# Patient Record
Sex: FEMALE | Race: WHITE | NOT HISPANIC OR LATINO | Employment: OTHER | URBAN - NONMETROPOLITAN AREA
[De-identification: names, ages, dates, MRNs, and addresses within clinical notes are randomized per-mention and may not be internally consistent; named-entity substitution may affect disease eponyms.]

---

## 2018-03-13 ENCOUNTER — HISTORICAL (OUTPATIENT)
Dept: ADMINISTRATIVE | Facility: HOSPITAL | Age: 73
End: 2018-03-13

## 2018-05-16 ENCOUNTER — HISTORICAL (OUTPATIENT)
Dept: ADMINISTRATIVE | Facility: HOSPITAL | Age: 73
End: 2018-05-16

## 2018-08-14 ENCOUNTER — HISTORICAL (OUTPATIENT)
Dept: ADMINISTRATIVE | Facility: HOSPITAL | Age: 73
End: 2018-08-14

## 2018-08-16 ENCOUNTER — HISTORICAL (OUTPATIENT)
Dept: ADMINISTRATIVE | Facility: HOSPITAL | Age: 73
End: 2018-08-16

## 2019-02-04 ENCOUNTER — HISTORICAL (OUTPATIENT)
Dept: ADMINISTRATIVE | Facility: HOSPITAL | Age: 74
End: 2019-02-04

## 2019-05-09 ENCOUNTER — HISTORICAL (OUTPATIENT)
Dept: ADMINISTRATIVE | Facility: HOSPITAL | Age: 74
End: 2019-05-09

## 2019-06-24 ENCOUNTER — HISTORICAL (OUTPATIENT)
Dept: ADMINISTRATIVE | Facility: HOSPITAL | Age: 74
End: 2019-06-24

## 2019-06-26 ENCOUNTER — HISTORICAL (OUTPATIENT)
Dept: ADMINISTRATIVE | Facility: HOSPITAL | Age: 74
End: 2019-06-26

## 2020-02-20 ENCOUNTER — HISTORICAL (OUTPATIENT)
Dept: ADMINISTRATIVE | Facility: HOSPITAL | Age: 75
End: 2020-02-20

## 2020-07-09 ENCOUNTER — HISTORICAL (OUTPATIENT)
Dept: ADMINISTRATIVE | Facility: HOSPITAL | Age: 75
End: 2020-07-09

## 2020-07-27 ENCOUNTER — HISTORICAL (OUTPATIENT)
Dept: ADMINISTRATIVE | Facility: HOSPITAL | Age: 75
End: 2020-07-27

## 2020-08-03 LAB
ABS NEUT (OLG): 3.5 X10(3)/MCL (ref 1.5–6.9)
APPEARANCE, UA: CLEAR
BILIRUB UR QL STRIP: NEGATIVE
BUN SERPL-MCNC: 13 MG/DL (ref 9.8–20.1)
CALCIUM SERPL-MCNC: 10 MG/DL (ref 8.4–10.2)
CHLORIDE SERPL-SCNC: 92 MMOL/L (ref 98–107)
CO2 SERPL-SCNC: 34 MMOL/L (ref 23–31)
COLOR UR: YELLOW
CREAT SERPL-MCNC: 0.79 MG/DL (ref 0.55–1.02)
CREAT/UREA NIT SERPL: 16
ERYTHROCYTE [DISTWIDTH] IN BLOOD BY AUTOMATED COUNT: 14.4 % (ref 11.5–17)
GLUCOSE (UA): NEGATIVE MG/DL
GLUCOSE SERPL-MCNC: 107 MG/DL (ref 82–115)
HCT VFR BLD AUTO: 36.6 % (ref 36–48)
HGB BLD-MCNC: 11.8 GM/DL (ref 12–16)
HGB UR QL STRIP: NEGATIVE
KETONES UR QL STRIP: NEGATIVE MG/DL
LEUKOCYTE ESTERASE UR QL STRIP: NEGATIVE
MCH RBC QN AUTO: 28 PG (ref 27–34)
MCHC RBC AUTO-ENTMCNC: 32 GM/DL (ref 31–36)
MCV RBC AUTO: 85 FL (ref 80–99)
NITRITE UR QL STRIP: NEGATIVE
PH UR STRIP: 8.5 [PH] (ref 4.6–8)
PLATELET # BLD AUTO: 278 X10(3)/MCL (ref 140–400)
PMV BLD AUTO: 9 FL (ref 6.8–10)
POTASSIUM SERPL-SCNC: 3.9 MMOL/L (ref 3.5–5.1)
PROT UR QL STRIP: NEGATIVE MG/DL
RBC # BLD AUTO: 4.29 X10(6)/MCL (ref 4.2–5.4)
SODIUM SERPL-SCNC: 136 MMOL/L (ref 136–145)
SP GR UR STRIP: 1.01 (ref 1–1.03)
UROBILINOGEN UR STRIP-ACNC: 0.2 EU/DL
WBC # SPEC AUTO: 5.8 X10(3)/MCL (ref 4.5–11.5)

## 2020-08-07 ENCOUNTER — HISTORICAL (OUTPATIENT)
Dept: MEDSURG UNIT | Facility: HOSPITAL | Age: 75
End: 2020-08-07

## 2020-08-08 LAB
HCT VFR BLD AUTO: 26.3 % (ref 36–48)
HGB BLD-MCNC: 8.7 GM/DL (ref 12–16)

## 2021-02-23 ENCOUNTER — HISTORICAL (OUTPATIENT)
Dept: ADMINISTRATIVE | Facility: HOSPITAL | Age: 76
End: 2021-02-23

## 2021-12-06 ENCOUNTER — HISTORICAL (OUTPATIENT)
Dept: ADMINISTRATIVE | Facility: HOSPITAL | Age: 76
End: 2021-12-06

## 2022-03-04 ENCOUNTER — HISTORICAL (OUTPATIENT)
Dept: ADMINISTRATIVE | Facility: HOSPITAL | Age: 77
End: 2022-03-04

## 2022-04-30 NOTE — OP NOTE
PREOPERATIVE DIAGNOSIS:  Severe spinal stenosis at L3-4 and L4-5.    POSTOPERATIVE DIAGNOSIS:  Severe spinal stenosis at L3-4 and L4-5.    PROCEDURE:  Lumbar laminectomy L3-4 and L4-5 bilaterally.    ANESTHESIA:  General.    BLOOD LOSS:  50 cc.    COMPLICATIONS:  None.    INDICATIONS:  Ms. Ordonez has had long-standing severe spinal stenosis with severe claudication.  Full informed consent was obtained.  Risks of the procedure were explained, not excluding infection, bleeding, pain, scarring, neurovascular injury, DVT, spinal instability, need for future surgery.    DESCRIPTION OF PROCEDURE:  She was brought the OR, given IV antibiotics.  A Cleaning was placed.  Rolled prone on a Desmond frame.  The back was prepped and draped sterilely.  Preincision marking was done identifying posterior spinous processes, L3, L4 and L5.  Mid lumbar incision was made.  I did use the bone scalpel for laminectomy.  Paraspinal muscles were reflected off the lamina and posterior spinous processes.  Deep retractors were positioned.  Facets were clearly identified at the L3-4 and L4-5 levels.  Care was taken to preserve 2/3 of the facet to try to avoid instability.  Using a bone scalpel, [employer] device with high-flow setting, did a laminectomy which created some beautiful vertical walls preserving pars as well as facets at both levels all sides.  Using tactile feel, carefully penetrated the inner cortex.  Then I could hinge the posterior spinous process and lamina off very carefully EN bloc, which respected the anatomical plane between the ligamentum flavum and the dura.  The dura was nicely and beautifully decompressed.  It was quite tight and thin.  I used a Kerrison #2 to clean up the facet on the right side, which was her more symptomatic side.  Inferiorly in the midline, she had a small sharp edge of bone which did create just a pinhole durotomy.  This was carefully dried and I used a dural sealant to seal that and then put a  fat graft on top and seal that as well, and it was a watertight seal.  Following this, beaded probe could easily be passed through the neuro foramen at both levels.  I was satisfied with the decompression and the resultant stability.  Fat was placed over the dura and laminectomy defect.  Paraspinal muscles were closed with interrupted #1 Vicryl, subcu 2-0 and very tight closure on the skin with 3-0 nylon.  Sealed the skin and put an Aquacel dressing.  No complications.        MICHAEL/EMBER   DD: 08/13/2020 1105   DT: 08/13/2020 1129  Job # [id number]

## 2022-05-05 NOTE — HISTORICAL [facility]
This is a historical note converted from [place]. Formatting and pictures may have been removed.  Please reference [facility] for original formatting and attached multimedia. Admit and Discharge Dates  Admit Date: 08/07/2020  Discharge Date: 08/09/2020  Physicians  Attending Physician - Gil BAUER, Bayron TAFOYA  Admitting Physician - Gil BAUER, Baryon TAFOYA  Consulting Physician - Gil BAUER, Bayron TAFOYA  Primary Care Physician - Rosemary BAUER, Loyd  Discharge Diagnosis  Spinal stenosis of lumbar region with radiculopathy?M48.061  Surgical Procedures  08/07/2020 - [id number] - Lumbar Decompression  Immunizations  No immunizations recorded for this visit.  Significant Findings  Patient did have a pinhole?durotomy?which was sealed with a dural sealant at the time of surgery.? No postoperative complications.  Objective  Vitals & Measurements  T:?36.8? ?C (Oral)? TMIN:?36.7? ?C (Oral)? TMAX:?37.3? ?C (Oral)? HR:?91(Peripheral)? BP:?120/63? SpO2:?99%?  Physical Exam  General:?well-developed well-nourished in no acute distress  Eye: EOMI, clear conjunctiva, eyelids normal  HENT:?Normocephalic/atraumatic, oropharynx and nasal mucosal surfaces moist  Neck: full range of motion, no lymphadenopathy  Respiratory:?Non-labored breathing  Cardiovascular:?regular rate and rhythm, no edema  Gastrointestinal:?soft, non-tender, non-distended, without masses to palpation  Genitourinary: no CVA tenderness to palpation  Musculoskeletal:???? Lumbar spine:?Aquacel dressing is clean dry and intact there is no excessive swelling or erythema noted about the incision area. ?Patient is neurovascularly intact to her bilateral lower extremities.  Integumentary: no rashes or skin lesions present  Neurologic: cranial nerves intact, no signs of peripheral neurological deficit, motor/sensory function intact  ?  Patient Discharge Condition  Stable, denies chest pain calf pain dyspnea or shortness of breath.? Hemoglobin?yesterday on 8/8/2020?was?8.7,  hematocrit was 26.3  Discharge Disposition  Patient is discharged to home discussion was had regarding no excessive?bending lifting?twisting?turning pushing or pulling.  She is discharged home with Norco 10/325 mg every 6 hours as needed for pain and Duricef 500 mg twice daily for 10 days.? Patient will follow-up?in our [place] clinic with Dr. Plummer on 8/17/2020 at 9:00 AM. ?Aquacel dressing may stay in place until that time.? She is to contact the office should any problems arise in the interim   Discharge Medication Reconciliation  Prescribed  acetaminophen-HYDROcodone (acetaminophen-hydrocodone 325 mg-10 mg oral tablet)?1 tab(s), Oral, q4hr, PRN pain, mild  Continue  aspirin (Aron Aspirin Regimen 81 mg oral delayed release tablet)?81 mg, Oral, Daily  carvedilol (carvedilol 6.25 mg oral tablet)?6.25 mg, Oral, BID  ezetimibe (ezetimibe 10 mg oral tablet)?10 mg, Oral, qPM  furosemide (furosemide 40 mg oral tablet)?40 mg, Oral, qAM  isosorbide mononitrate (isosorbide MONOnitrate 30 mg oral tablet, Extended Release)?30 mg, Oral, qAM  lisinopril (lisinopril 20 mg oral tablet)?20 mg, Oral, qAM  pantoprazole (Pantoprazole 40 mg ORAL EC-Tablet)?40 mg, Oral, qAM  potassium chloride (potassium chloride 20 mEq oral TABLET extended release)?20 mEq, Oral, qAM  pravastatin (pravastatin 10 mg oral tablet)?10 mg, Oral, qAM  venlafaxine (venlafaxine 75 mg oral capsule, extended release)?75 mg, Oral, qAM  Education and Orders Provided  Discharge - 08/09/20 12:56:00 CDT, Home, D/c to home. No bending, lifting, twisting, turning, pushing or pulling. ?Norco 10/325mg q 6 hrs prn, Duricef 500mg BID x 10 days. Follow up with Dr. Cordero 08/17/20 at 9:00am?  Follow up  Bayron Cordero, on 08/17/2020

## 2022-06-20 DIAGNOSIS — M18.12 PRIMARY OSTEOARTHRITIS OF FIRST CARPOMETACARPAL JOINT OF LEFT HAND: Primary | ICD-10-CM

## 2022-06-21 ENCOUNTER — HOSPITAL ENCOUNTER (OUTPATIENT)
Dept: RADIOLOGY | Facility: HOSPITAL | Age: 77
Discharge: HOME | End: 2022-06-21
Attending: SPECIALIST
Payer: MEDICARE

## 2022-06-21 ENCOUNTER — CLINICAL SUPPORT (OUTPATIENT)
Dept: RESPIRATORY THERAPY | Facility: HOSPITAL | Age: 77
End: 2022-06-21
Attending: SPECIALIST
Payer: MEDICARE

## 2022-06-21 DIAGNOSIS — M18.12 PRIMARY OSTEOARTHRITIS OF FIRST CARPOMETACARPAL JOINT OF LEFT HAND: ICD-10-CM

## 2022-06-21 DIAGNOSIS — Z01.818 PRE-OP EXAM: ICD-10-CM

## 2022-06-21 PROCEDURE — 71045 X-RAY EXAM CHEST 1 VIEW: CPT | Mod: TC

## 2022-06-21 PROCEDURE — 93005 ELECTROCARDIOGRAM TRACING: CPT

## 2022-06-21 RX ORDER — CARVEDILOL 6.25 MG/1
6.25 TABLET ORAL 2 TIMES DAILY
COMMUNITY
Start: 2022-06-07

## 2022-06-21 RX ORDER — ASPIRIN 81 MG/1
81 TABLET ORAL EVERY MORNING
COMMUNITY

## 2022-06-21 RX ORDER — SACUBITRIL AND VALSARTAN 49; 51 MG/1; MG/1
1 TABLET, FILM COATED ORAL 2 TIMES DAILY
COMMUNITY
Start: 2022-06-06

## 2022-06-21 RX ORDER — ROSUVASTATIN CALCIUM 5 MG/1
5 TABLET, COATED ORAL NIGHTLY
COMMUNITY
Start: 2022-05-05

## 2022-06-21 RX ORDER — VENLAFAXINE HYDROCHLORIDE 75 MG/1
75 CAPSULE, EXTENDED RELEASE ORAL EVERY MORNING
COMMUNITY
Start: 2022-05-23

## 2022-06-21 RX ORDER — SPIRONOLACTONE 25 MG/1
25 TABLET ORAL EVERY MORNING
COMMUNITY
Start: 2022-06-06

## 2022-06-21 RX ORDER — EZETIMIBE 10 MG/1
10 TABLET ORAL EVERY MORNING
COMMUNITY
Start: 2022-05-23

## 2022-06-21 RX ORDER — POTASSIUM CHLORIDE 20 MEQ/1
20 TABLET, EXTENDED RELEASE ORAL EVERY MORNING
COMMUNITY
Start: 2022-06-07

## 2022-06-21 RX ORDER — PANTOPRAZOLE SODIUM 40 MG/1
40 TABLET, DELAYED RELEASE ORAL EVERY MORNING
COMMUNITY

## 2022-06-21 RX ORDER — NITROGLYCERIN 0.4 MG/1
0.4 TABLET SUBLINGUAL DAILY PRN
COMMUNITY

## 2022-06-21 RX ORDER — FUROSEMIDE 20 MG/1
20 TABLET ORAL 2 TIMES DAILY
COMMUNITY
Start: 2022-06-17

## 2022-06-21 NOTE — DISCHARGE INSTRUCTIONS
Use CHG wipes as directed. Nothing to eat or drink after midnight. Take Coreg and Entresto AM of procedure with sip of water.

## 2022-06-22 ENCOUNTER — ANESTHESIA EVENT (OUTPATIENT)
Dept: SURGERY | Facility: HOSPITAL | Age: 77
End: 2022-06-22
Payer: MEDICARE

## 2022-06-22 NOTE — ANESTHESIA PREPROCEDURE EVALUATION
06/22/2022  Job Hancock is a 76 y.o., female.      Pre-op Assessment    I have reviewed the Patient Summary Reports.     I have reviewed the Nursing Notes. I have reviewed the NPO Status.   I have reviewed the Medications.     Review of Systems  Anesthesia Hx:  No problems with previous Anesthesia  Denies Family Hx of Anesthesia complications.   Denies Personal Hx of Anesthesia complications.   Social:  Non-Smoker    Hematology/Oncology:  Hematology Normal   Oncology Normal     EENT/Dental:EENT/Dental Normal   Cardiovascular:   Hypertension, well controlled CAD  CABG/stent  hyperlipidemia    Renal/:  Renal/ Normal     Hepatic/GI:   GERD    Musculoskeletal:   Arthritis     Neurological:  Neurology Normal    Endocrine:  Endocrine Normal    Dermatological:  Skin Normal    Psych:  Psychiatric Normal           Physical Exam  General: Well nourished, Cooperative, Alert and Oriented    Airway:  Mallampati: II   Mouth Opening: Normal  TM Distance: Normal  Tongue: Normal  Neck ROM: Normal ROM    Dental:  Intact        Anesthesia Plan  Type of Anesthesia, risks & benefits discussed:    Anesthesia Type: Gen ETT  Intra-op Monitoring Plan: Standard ASA Monitors  Post Op Pain Control Plan: multimodal analgesia  Induction:  IV  Airway Plan: Direct  Informed Consent: Informed consent signed with the Patient and all parties understand the risks and agree with anesthesia plan.  All questions answered.   ASA Score: 3  Day of Surgery Review of History & Physical: I have interviewed and examined the patient. I have reviewed the patient's H&P dated: There are no significant changes. H&P completed by Anesthesiologist.    Ready For Surgery From Anesthesia Perspective.     .

## 2022-06-23 ENCOUNTER — HOSPITAL ENCOUNTER (OUTPATIENT)
Facility: HOSPITAL | Age: 77
Discharge: HOME | End: 2022-06-23
Attending: SPECIALIST | Admitting: SPECIALIST
Payer: MEDICARE

## 2022-06-23 ENCOUNTER — ANESTHESIA (OUTPATIENT)
Dept: SURGERY | Facility: HOSPITAL | Age: 77
End: 2022-06-23
Payer: MEDICARE

## 2022-06-23 DIAGNOSIS — M18.12 PRIMARY OSTEOARTHRITIS OF FIRST CARPOMETACARPAL JOINT OF LEFT HAND: ICD-10-CM

## 2022-06-23 PROCEDURE — C1713 ANCHOR/SCREW BN/BN,TIS/BN: HCPCS | Performed by: SPECIALIST

## 2022-06-23 PROCEDURE — 25000003 PHARM REV CODE 250: Performed by: NURSE ANESTHETIST, CERTIFIED REGISTERED

## 2022-06-23 PROCEDURE — 63600175 PHARM REV CODE 636 W HCPCS: Performed by: NURSE ANESTHETIST, CERTIFIED REGISTERED

## 2022-06-23 PROCEDURE — 71000015 HC POSTOP RECOV 1ST HR: Performed by: SPECIALIST

## 2022-06-23 PROCEDURE — 63600175 PHARM REV CODE 636 W HCPCS: Performed by: ANESTHESIOLOGY

## 2022-06-23 PROCEDURE — 71000033 HC RECOVERY, INTIAL HOUR: Performed by: SPECIALIST

## 2022-06-23 PROCEDURE — 36000709 HC OR TIME LEV III EA ADD 15 MIN: Performed by: SPECIALIST

## 2022-06-23 PROCEDURE — 25000003 PHARM REV CODE 250: Performed by: PHYSICIAN ASSISTANT

## 2022-06-23 PROCEDURE — 71000016 HC POSTOP RECOV ADDL HR: Performed by: SPECIALIST

## 2022-06-23 PROCEDURE — 25000003 PHARM REV CODE 250: Performed by: ANESTHESIOLOGY

## 2022-06-23 PROCEDURE — 63600175 PHARM REV CODE 636 W HCPCS: Performed by: SPECIALIST

## 2022-06-23 PROCEDURE — 37000008 HC ANESTHESIA 1ST 15 MINUTES: Performed by: SPECIALIST

## 2022-06-23 PROCEDURE — 36000708 HC OR TIME LEV III 1ST 15 MIN: Performed by: SPECIALIST

## 2022-06-23 PROCEDURE — 37000009 HC ANESTHESIA EA ADD 15 MINS: Performed by: SPECIALIST

## 2022-06-23 DEVICE — SYS IMPLANT CMC MIN TR 1.1MM: Type: IMPLANTABLE DEVICE | Site: HAND | Status: FUNCTIONAL

## 2022-06-23 RX ORDER — PROPOFOL 10 MG/ML
INJECTION, EMULSION INTRAVENOUS
Status: DISCONTINUED | OUTPATIENT
Start: 2022-06-23 | End: 2022-06-23

## 2022-06-23 RX ORDER — LIDOCAINE HYDROCHLORIDE 10 MG/ML
1 INJECTION, SOLUTION EPIDURAL; INFILTRATION; INTRACAUDAL; PERINEURAL ONCE
Status: DISCONTINUED | OUTPATIENT
Start: 2022-06-23 | End: 2022-06-23

## 2022-06-23 RX ORDER — ONDANSETRON 2 MG/ML
4 INJECTION INTRAMUSCULAR; INTRAVENOUS EVERY 12 HOURS PRN
Status: DISCONTINUED | OUTPATIENT
Start: 2022-06-23 | End: 2022-06-23 | Stop reason: HOSPADM

## 2022-06-23 RX ORDER — SODIUM CHLORIDE, SODIUM LACTATE, POTASSIUM CHLORIDE, CALCIUM CHLORIDE 600; 310; 30; 20 MG/100ML; MG/100ML; MG/100ML; MG/100ML
INJECTION, SOLUTION INTRAVENOUS CONTINUOUS
Status: DISCONTINUED | OUTPATIENT
Start: 2022-06-23 | End: 2022-06-23 | Stop reason: HOSPADM

## 2022-06-23 RX ORDER — KETOROLAC TROMETHAMINE 30 MG/ML
15 INJECTION, SOLUTION INTRAMUSCULAR; INTRAVENOUS ONCE
Status: DISCONTINUED | OUTPATIENT
Start: 2022-06-23 | End: 2022-06-23

## 2022-06-23 RX ORDER — HYDROMORPHONE HYDROCHLORIDE 2 MG/ML
0.2 INJECTION, SOLUTION INTRAMUSCULAR; INTRAVENOUS; SUBCUTANEOUS EVERY 5 MIN PRN
Status: DISCONTINUED | OUTPATIENT
Start: 2022-06-23 | End: 2022-06-23 | Stop reason: HOSPADM

## 2022-06-23 RX ORDER — ONDANSETRON 4 MG/1
8 TABLET, ORALLY DISINTEGRATING ORAL EVERY 8 HOURS PRN
Status: DISCONTINUED | OUTPATIENT
Start: 2022-06-23 | End: 2022-06-23 | Stop reason: HOSPADM

## 2022-06-23 RX ORDER — KETOROLAC TROMETHAMINE 30 MG/ML
INJECTION, SOLUTION INTRAMUSCULAR; INTRAVENOUS
Status: DISCONTINUED | OUTPATIENT
Start: 2022-06-23 | End: 2022-06-23

## 2022-06-23 RX ORDER — LIDOCAINE HYDROCHLORIDE 20 MG/ML
INJECTION, SOLUTION EPIDURAL; INFILTRATION; INTRACAUDAL; PERINEURAL
Status: DISCONTINUED | OUTPATIENT
Start: 2022-06-23 | End: 2022-06-23

## 2022-06-23 RX ORDER — OXYCODONE HYDROCHLORIDE 5 MG/1
10 TABLET ORAL EVERY 4 HOURS PRN
Status: DISCONTINUED | OUTPATIENT
Start: 2022-06-23 | End: 2022-06-23 | Stop reason: HOSPADM

## 2022-06-23 RX ORDER — KETOROLAC TROMETHAMINE 30 MG/ML
15 INJECTION, SOLUTION INTRAMUSCULAR; INTRAVENOUS ONCE
Status: DISCONTINUED | OUTPATIENT
Start: 2022-06-23 | End: 2022-06-23 | Stop reason: HOSPADM

## 2022-06-23 RX ORDER — CEFAZOLIN SODIUM 2 G/50ML
2 SOLUTION INTRAVENOUS
Status: COMPLETED | OUTPATIENT
Start: 2022-06-23 | End: 2022-06-23

## 2022-06-23 RX ORDER — DEXAMETHASONE SODIUM PHOSPHATE 4 MG/ML
INJECTION, SOLUTION INTRA-ARTICULAR; INTRALESIONAL; INTRAMUSCULAR; INTRAVENOUS; SOFT TISSUE
Status: DISCONTINUED | OUTPATIENT
Start: 2022-06-23 | End: 2022-06-23

## 2022-06-23 RX ORDER — PHENYLEPHRINE HYDROCHLORIDE 10 MG/ML
INJECTION INTRAVENOUS
Status: DISCONTINUED | OUTPATIENT
Start: 2022-06-23 | End: 2022-06-23

## 2022-06-23 RX ORDER — TRAMADOL HYDROCHLORIDE 50 MG/1
50 TABLET ORAL
Status: COMPLETED | OUTPATIENT
Start: 2022-06-23 | End: 2022-06-23

## 2022-06-23 RX ORDER — TRAMADOL HYDROCHLORIDE 50 MG/1
50 TABLET ORAL EVERY 6 HOURS PRN
Status: DISCONTINUED | OUTPATIENT
Start: 2022-06-23 | End: 2022-06-23 | Stop reason: HOSPADM

## 2022-06-23 RX ORDER — HYDROCODONE BITARTRATE AND ACETAMINOPHEN 10; 325 MG/1; MG/1
1 TABLET ORAL EVERY 6 HOURS PRN
Qty: 28 TABLET | Refills: 0 | Status: SHIPPED | OUTPATIENT
Start: 2022-06-23

## 2022-06-23 RX ORDER — ONDANSETRON 2 MG/ML
INJECTION INTRAMUSCULAR; INTRAVENOUS
Status: DISCONTINUED | OUTPATIENT
Start: 2022-06-23 | End: 2022-06-23

## 2022-06-23 RX ORDER — MORPHINE SULFATE 10 MG/ML
2 INJECTION INTRAMUSCULAR; INTRAVENOUS; SUBCUTANEOUS
Status: DISCONTINUED | OUTPATIENT
Start: 2022-06-23 | End: 2022-06-23 | Stop reason: HOSPADM

## 2022-06-23 RX ORDER — EPHEDRINE SULFATE 50 MG/ML
INJECTION, SOLUTION INTRAVENOUS
Status: DISCONTINUED | OUTPATIENT
Start: 2022-06-23 | End: 2022-06-23

## 2022-06-23 RX ORDER — GABAPENTIN 300 MG/1
300 CAPSULE ORAL
Status: COMPLETED | OUTPATIENT
Start: 2022-06-23 | End: 2022-06-23

## 2022-06-23 RX ORDER — ACETAMINOPHEN 500 MG
1000 TABLET ORAL
Status: COMPLETED | OUTPATIENT
Start: 2022-06-23 | End: 2022-06-23

## 2022-06-23 RX ORDER — CEFADROXIL 500 MG/1
500 CAPSULE ORAL EVERY 12 HOURS
Qty: 14 CAPSULE | Refills: 0 | Status: SHIPPED | OUTPATIENT
Start: 2022-06-23 | End: 2022-06-30

## 2022-06-23 RX ADMIN — PHENYLEPHRINE HYDROCHLORIDE 200 MCG: 10 INJECTION INTRAVENOUS at 08:06

## 2022-06-23 RX ADMIN — EPHEDRINE SULFATE 20 MG: 50 INJECTION INTRAVENOUS at 07:06

## 2022-06-23 RX ADMIN — ONDANSETRON 4 MG: 2 INJECTION INTRAMUSCULAR; INTRAVENOUS at 07:06

## 2022-06-23 RX ADMIN — KETOROLAC TROMETHAMINE 15 MG: 30 INJECTION, SOLUTION INTRAMUSCULAR; INTRAVENOUS at 01:06

## 2022-06-23 RX ADMIN — OXYCODONE HYDROCHLORIDE 10 MG: 5 TABLET ORAL at 10:06

## 2022-06-23 RX ADMIN — HYDROMORPHONE HYDROCHLORIDE 0.2 MG: 2 INJECTION, SOLUTION INTRAMUSCULAR; INTRAVENOUS; SUBCUTANEOUS at 09:06

## 2022-06-23 RX ADMIN — KETOROLAC TROMETHAMINE 15 MG: 30 INJECTION, SOLUTION INTRAMUSCULAR at 08:06

## 2022-06-23 RX ADMIN — ACETAMINOPHEN 1000 MG: 500 TABLET, FILM COATED ORAL at 05:06

## 2022-06-23 RX ADMIN — SODIUM CHLORIDE, POTASSIUM CHLORIDE, SODIUM LACTATE AND CALCIUM CHLORIDE: 600; 310; 30; 20 INJECTION, SOLUTION INTRAVENOUS at 05:06

## 2022-06-23 RX ADMIN — DEXAMETHASONE SODIUM PHOSPHATE 4 MG: 4 INJECTION, SOLUTION INTRA-ARTICULAR; INTRALESIONAL; INTRAMUSCULAR; INTRAVENOUS; SOFT TISSUE at 07:06

## 2022-06-23 RX ADMIN — PHENYLEPHRINE HYDROCHLORIDE 200 MCG: 10 INJECTION INTRAVENOUS at 07:06

## 2022-06-23 RX ADMIN — EPHEDRINE SULFATE 10 MG: 50 INJECTION INTRAVENOUS at 07:06

## 2022-06-23 RX ADMIN — LIDOCAINE HYDROCHLORIDE 40 MG: 20 INJECTION, SOLUTION EPIDURAL; INFILTRATION; INTRACAUDAL; PERINEURAL at 06:06

## 2022-06-23 RX ADMIN — HYDROMORPHONE HYDROCHLORIDE 0.2 MG: 2 INJECTION, SOLUTION INTRAMUSCULAR; INTRAVENOUS; SUBCUTANEOUS at 08:06

## 2022-06-23 RX ADMIN — PHENYLEPHRINE HYDROCHLORIDE 100 MCG: 10 INJECTION INTRAVENOUS at 08:06

## 2022-06-23 RX ADMIN — PROPOFOL 120 MG: 10 INJECTION, EMULSION INTRAVENOUS at 06:06

## 2022-06-23 RX ADMIN — GABAPENTIN 300 MG: 300 CAPSULE ORAL at 05:06

## 2022-06-23 RX ADMIN — CEFAZOLIN SODIUM 2 G: 2 SOLUTION INTRAVENOUS at 07:06

## 2022-06-23 RX ADMIN — TRAMADOL HYDROCHLORIDE 50 MG: 50 TABLET, COATED ORAL at 05:06

## 2022-06-23 NOTE — OP NOTE
Operative note     Date: 6/23/2022     Preop diagnosis:  Carpometacarpal osteoarthritis left thumb    Postop diagnosis: same    Procedure:  CMC suspension arthroplasty with excision of trapezium left thumb    Surgeon: Bayron Cordero M.D.    Assistant: KALYN Corral    Anesthesia:  General    Complications: none    Blood loss: nil    Procedure in detail:  Informed consent was obtained.  Risks of the procedure was explained not excluding infection, bleeding, pain, scarring, neurovascular injury, recurrent subluxation of the thumb.  This patient has a long history of advanced CMC arthroplasty at the base of the thumb.  Her trapezium is arthritic on both the metacarpal side as well as the scaphoid side.  CMC arthroplasty was explained and I recommended suspension stabilization with a mini tight rope.  She was brought to the OR given general anesthesia.  She was given IV antibiotics.  Arm was prepped and draped and tourniquet was inflated to 250. A dorsal radial incision was made over the base of the 1st metacarpal.  Small branch the radial artery was identified and protected.  The capsule was opened over the arthritic CMC joint.  The trapezium was resected using a rongeur.  The entire trapezium was excised.  The abductor tendon to the base of the 1st metacarpal was protected and left intact.  Next a 2nd incision was made over the 2nd webspace at the proximal end of the ulnar side of the 2nd metacarpal shaft.  Periosteum over the metacarpal was elevated for exposure next using fluoro advanced a K-wire across the base of the 1st metacarpal up across the 2nd in a direction that was suspend the thumb in appropriate position.  Next the suture was passed from the 1st to 2nd metacarpal and the button was positioned over the base of the 1st.  Provisional tension was applied and using C-arm to ensure that it was not overly tightened.  I could lay the hand flat and mobilization of the thumb was still  excellent.  Sq knot was placed over the 2nd button on the ulnar side of the 2nd.  And 4 additional knots.  Soft tissue was closed over the button suture.  The skin was closed with 4-0 nylon.  Sterile dressing was applied and a thumb spica splint.  No complications.    Bayron Cordero M.D.

## 2022-06-23 NOTE — ANESTHESIA PROCEDURE NOTES
Intubation    Date/Time: 6/23/2022 7:00 AM  Performed by: Arsenio Mejia CRNA  Authorized by: Nu Boyer MD     Intubation:     Induction:  Intravenous    Mask Ventilation:  N/a    Attempts:  1    Attempted By:  CRNA    Difficult Airway Encountered?: No      Airway Device:  Supraglottic airway/LMA    Airway Device Size:  3.0    Style/Cuff Inflation:  Uncuffed    Placement Verified By:  Capnometry    Complicating Factors:  None    Findings Post-Intubation:  BS equal bilateral and atraumatic/condition of teeth unchanged

## 2022-06-23 NOTE — ANESTHESIA POSTPROCEDURE EVALUATION
Anesthesia Post Evaluation    Patient: Job Hancock    Procedure(s) Performed: Procedure(s) (LRB):  INTERPOSITION ARTHROPLASTY, CMC JOINT / Ex. of trapezium with FCR inter postion graft & tight rope (Left)    Final Anesthesia Type: general      Patient location during evaluation: PACU  Patient participation: Yes- Able to Participate  Level of consciousness: awake and alert  Post-procedure vital signs: reviewed and stable  Pain management: adequate  Airway patency: patent    PONV status at discharge: No PONV  Anesthetic complications: no      Cardiovascular status: stable  Respiratory status: unassisted, room air and spontaneous ventilation  Hydration status: euvolemic  Follow-up not needed.          Vitals Value Taken Time   /56 06/23/22 0925   Temp 36.2 °C (97.2 °F) 06/23/22 0840   Pulse 74 06/23/22 0925   Resp 12 06/23/22 0925   SpO2 100 % 06/23/22 0925   Vitals shown include unvalidated device data.      No case tracking events are documented in the log.      Pain/Neli Score: Pain Rating Prior to Med Admin: 6 (6/23/2022  9:16 AM)  Neli Score: 8 (6/23/2022  9:22 AM)

## 2022-06-23 NOTE — DISCHARGE SUMMARY
[facility] - Periop Services  Discharge Note  Short Stay    Procedure(s) (LRB):  INTERPOSITION ARTHROPLASTY, CMC JOINT / Ex. of trapezium with FCR inter postion graft & tight rope (Left)    OUTCOME: Patient tolerated treatment/procedure well without complication and is now ready for discharge.    DISPOSITION: Home or Self Care    FINAL DIAGNOSIS:  Primary osteoarthritis of first carpometacarpal joint of left hand    FOLLOWUP: In clinic    DISCHARGE INSTRUCTIONS:    Discharge Procedure Orders   Diet general     Keep surgical extremity elevated     Ice to affected area   Order Comments: using barrier between ice and skin (specify duration&frequency)     No driving, operating heavy equipment or signing legal documents while taking pain medication     Call MD for:  temperature >100.4     Call MD for:  persistent nausea and vomiting     Call MD for:  severe uncontrolled pain     Call MD for:  difficulty breathing, headache or visual disturbances     Call MD for:  redness, tenderness, or signs of infection (pain, swelling, redness, odor or green/yellow discharge around incision site)     Call MD for:  hives     Call MD for:  persistent dizziness or light-headedness     Call MD for:  extreme fatigue     Leave dressing on - Keep it clean, dry, and intact until clinic visit        TIME SPENT ON DISCHARGE: 20 minutes

## 2022-06-23 NOTE — TRANSFER OF CARE
"Anesthesia Transfer of Care Note    Patient: Job Hancock    Procedure(s) Performed: Procedure(s) (LRB):  INTERPOSITION ARTHROPLASTY, CMC JOINT / Ex. of trapezium with FCR inter postion graft & tight rope (Left)    Patient location: PACU    Anesthesia Type: general    Transport from OR: Transported from OR on room air with adequate spontaneous ventilation    Post pain: adequate analgesia    Post assessment: no apparent anesthetic complications    Post vital signs: stable    Level of consciousness: awake    Nausea/Vomiting: no nausea/vomiting    Complications: none    Transfer of care protocol was followed      Last vitals:   Visit Vitals  /63 (BP Location: Right arm, Patient Position: Lying)   Pulse 65   Temp 36.5 °C (97.7 °F) (Oral)   Resp 18   Ht 5' 5" (1.651 m)   Wt 54.4 kg (120 lb)   SpO2 96%   Breastfeeding No   BMI 19.97 kg/m²     "

## 2022-06-28 VITALS
TEMPERATURE: 97 F | RESPIRATION RATE: 18 BRPM | WEIGHT: 120 LBS | HEART RATE: 70 BPM | DIASTOLIC BLOOD PRESSURE: 51 MMHG | OXYGEN SATURATION: 100 % | HEIGHT: 65 IN | BODY MASS INDEX: 19.99 KG/M2 | SYSTOLIC BLOOD PRESSURE: 104 MMHG

## 2023-01-05 ENCOUNTER — HISTORICAL (OUTPATIENT)
Dept: ADMINISTRATIVE | Facility: HOSPITAL | Age: 78
End: 2023-01-05
Payer: MEDICARE

## 2023-03-10 ENCOUNTER — HOSPITAL ENCOUNTER (OUTPATIENT)
Dept: RADIOLOGY | Facility: HOSPITAL | Age: 78
Discharge: HOME | End: 2023-03-10
Attending: INTERNAL MEDICINE
Payer: MEDICARE

## 2023-03-10 VITALS — WEIGHT: 120 LBS | BODY MASS INDEX: 20.49 KG/M2 | HEIGHT: 64 IN

## 2023-03-10 DIAGNOSIS — Z12.31 BREAST CANCER SCREENING BY MAMMOGRAM: ICD-10-CM

## 2023-03-10 PROCEDURE — 77067 SCR MAMMO BI INCL CAD: CPT | Mod: TC

## 2023-03-14 ENCOUNTER — HOSPITAL ENCOUNTER (OUTPATIENT)
Dept: RADIOLOGY | Facility: HOSPITAL | Age: 78
Discharge: HOME | End: 2023-03-14
Attending: INTERNAL MEDICINE
Payer: MEDICARE

## 2023-03-14 DIAGNOSIS — D49.3 NEOPLASM, BREAST: ICD-10-CM

## 2023-03-14 PROCEDURE — 76641 ULTRASOUND BREAST COMPLETE: CPT | Mod: TC,RT

## 2023-04-04 ENCOUNTER — LAB VISIT (OUTPATIENT)
Dept: LAB | Facility: HOSPITAL | Age: 78
End: 2023-04-04
Attending: INTERNAL MEDICINE
Payer: MEDICARE

## 2023-04-04 DIAGNOSIS — R79.1 ABNORMAL PROTHROMBIN TIME (PT): ICD-10-CM

## 2023-04-04 DIAGNOSIS — R79.89 ABNORMAL CBC: ICD-10-CM

## 2023-04-04 DIAGNOSIS — Z01.812 PRE-PROCEDURAL LABORATORY EXAMINATION: Primary | ICD-10-CM

## 2023-04-04 DIAGNOSIS — D68.9 COAGULATION DEFECT: ICD-10-CM

## 2023-04-04 LAB
ANION GAP SERPL CALC-SCNC: 6 MEQ/L (ref 2–13)
APTT PPP: 25 SECONDS (ref 23–29.4)
BASOPHILS # BLD AUTO: 0.03 X10(3)/MCL (ref 0.01–0.08)
BASOPHILS NFR BLD AUTO: 0.5 % (ref 0.1–1.2)
BUN SERPL-MCNC: 25 MG/DL (ref 7–20)
CALCIUM SERPL-MCNC: 10 MG/DL (ref 8.4–10.2)
CHLORIDE SERPL-SCNC: 104 MMOL/L (ref 98–110)
CO2 SERPL-SCNC: 30 MMOL/L (ref 21–32)
CREAT SERPL-MCNC: 0.74 MG/DL (ref 0.66–1.25)
CREAT/UREA NIT SERPL: 34 (ref 12–20)
EOSINOPHIL # BLD AUTO: 0.08 X10(3)/MCL (ref 0.04–0.36)
EOSINOPHIL NFR BLD AUTO: 1.3 % (ref 0.7–7)
ERYTHROCYTE [DISTWIDTH] IN BLOOD BY AUTOMATED COUNT: 13.5 % (ref 11–14.5)
GFR SERPLBLD CREATININE-BSD FMLA CKD-EPI: 82 MLS/MIN/1.73/M2
GLUCOSE SERPL-MCNC: 102 MG/DL (ref 70–115)
HCT VFR BLD AUTO: 34.5 % (ref 36–48)
HGB BLD-MCNC: 11 G/DL (ref 11.8–16)
IMM GRANULOCYTES # BLD AUTO: 0.06 X10(3)/MCL (ref 0–0.03)
IMM GRANULOCYTES NFR BLD AUTO: 1 % (ref 0–0.5)
INR BLD: 0.94
LYMPHOCYTES # BLD AUTO: 1.49 X10(3)/MCL (ref 1.16–3.74)
LYMPHOCYTES NFR BLD AUTO: 25 % (ref 20–55)
MCH RBC QN AUTO: 28.1 PG (ref 27–34)
MCV RBC AUTO: 88 FL (ref 79–99)
MEAN CELL HEMOGLOBIN CONCENTRATION (OHS) G/DL: 31.9 G/DL (ref 31–37)
MONOCYTES # BLD AUTO: 0.63 X10(3)/MCL (ref 0.24–0.36)
MONOCYTES NFR BLD AUTO: 10.6 % (ref 4.7–12.5)
NEUTROPHILS # BLD AUTO: 3.67 X10(3)/MCL (ref 1.56–6.13)
NEUTROPHILS NFR BLD AUTO: 61.6 % (ref 37–73)
NRBC BLD AUTO-RTO: 0 % (ref 0–1)
PLATELET # BLD AUTO: 311 X10(3)/MCL (ref 140–371)
PMV BLD AUTO: 9.6 FL (ref 9.4–12.4)
POTASSIUM SERPL-SCNC: 4.6 MMOL/L (ref 3.5–5.1)
PROTHROMBIN TIME: 9.6 SECONDS (ref 9.3–11.9)
RBC # BLD AUTO: 3.92 X10(6)/MCL (ref 4–5.1)
SODIUM SERPL-SCNC: 140 MMOL/L (ref 135–145)
WBC # SPEC AUTO: 6 X10(3)/MCL (ref 4–11.5)

## 2023-04-04 PROCEDURE — 80048 BASIC METABOLIC PNL TOTAL CA: CPT

## 2023-04-04 PROCEDURE — 85730 THROMBOPLASTIN TIME PARTIAL: CPT

## 2023-04-04 PROCEDURE — 85610 PROTHROMBIN TIME: CPT

## 2023-04-04 PROCEDURE — 85025 COMPLETE CBC W/AUTO DIFF WBC: CPT

## 2023-04-04 PROCEDURE — 36415 COLL VENOUS BLD VENIPUNCTURE: CPT

## 2023-05-30 ENCOUNTER — HOSPITAL ENCOUNTER (OUTPATIENT)
Dept: RADIOLOGY | Facility: HOSPITAL | Age: 78
Discharge: HOME | End: 2023-05-30
Attending: INTERNAL MEDICINE
Payer: MEDICARE

## 2023-05-30 DIAGNOSIS — M54.2 CERVICALGIA: ICD-10-CM

## 2023-05-30 PROCEDURE — 72125 CT NECK SPINE W/O DYE: CPT | Mod: TC

## 2023-06-20 ENCOUNTER — HOSPITAL ENCOUNTER (OUTPATIENT)
Dept: RADIOLOGY | Facility: HOSPITAL | Age: 78
Discharge: HOME | End: 2023-06-20
Attending: SPECIALIST
Payer: MEDICARE

## 2023-06-20 DIAGNOSIS — M79.641 PAIN IN RIGHT HAND: ICD-10-CM

## 2023-06-20 PROCEDURE — 73130 X-RAY EXAM OF HAND: CPT | Mod: TC,RT

## 2023-06-23 DIAGNOSIS — G56.01 CARPAL TUNNEL SYNDROME ON RIGHT: Primary | ICD-10-CM

## 2023-06-26 ENCOUNTER — HOSPITAL ENCOUNTER (OUTPATIENT)
Dept: RADIOLOGY | Facility: HOSPITAL | Age: 78
Discharge: HOME | End: 2023-06-26
Attending: SPECIALIST
Payer: MEDICARE

## 2023-06-26 ENCOUNTER — CLINICAL SUPPORT (OUTPATIENT)
Dept: RESPIRATORY THERAPY | Facility: HOSPITAL | Age: 78
End: 2023-06-26
Attending: SPECIALIST
Payer: MEDICARE

## 2023-06-26 DIAGNOSIS — G56.01 CARPAL TUNNEL SYNDROME ON RIGHT: ICD-10-CM

## 2023-06-26 DIAGNOSIS — Z01.811 PRE-OP CHEST EXAM: ICD-10-CM

## 2023-06-26 PROCEDURE — 93005 ELECTROCARDIOGRAM TRACING: CPT

## 2023-06-26 PROCEDURE — 71046 X-RAY EXAM CHEST 2 VIEWS: CPT | Mod: TC

## 2023-06-26 PROCEDURE — 93010 ELECTROCARDIOGRAM REPORT: CPT | Mod: ,,, | Performed by: STUDENT IN AN ORGANIZED HEALTH CARE EDUCATION/TRAINING PROGRAM

## 2023-06-26 PROCEDURE — 93010 EKG 12-LEAD: ICD-10-PCS | Mod: ,,, | Performed by: STUDENT IN AN ORGANIZED HEALTH CARE EDUCATION/TRAINING PROGRAM

## 2023-06-26 NOTE — DISCHARGE INSTRUCTIONS
POST-OP INSTRUCTIONS FOR CARPAL TUNNEL RELEASE     Bayron Cordero M.D.     Orthopedic Surgery/Sports Medicine     [facility] [place] [address]     [place] [address]     Office: [phone]          POST-OP INSTRUCTIONS FOR CARPAL TUNNEL RELEASE          1.  Keep splint dry and clean.     2.  Patient may remove splint two days after surgery.  Remove           splint sooner in case of severe pain or swelling of the hand.     3.  Keep incision dry and clean.  Clean incision with antibacterial           soap and water.     4.  Do not soak incision in water.     5.  No heavy lifting with hand.     6.  Patient may do very light activity with hand.     7.  Call Dr. Cordero or staff in case of severe pain, redness, or           excessive drainage from the incision.

## 2023-06-27 ENCOUNTER — ANESTHESIA EVENT (OUTPATIENT)
Dept: SURGERY | Facility: HOSPITAL | Age: 78
End: 2023-06-27
Payer: MEDICARE

## 2023-06-27 NOTE — ANESTHESIA PREPROCEDURE EVALUATION
06/27/2023  Job Hancock is a 77 y.o., female.      Pre-op Assessment    I have reviewed the Patient Summary Reports.     I have reviewed the Nursing Notes. I have reviewed the NPO Status.   I have reviewed the Medications.     Review of Systems  Anesthesia Hx:  Denies Family Hx of Anesthesia complications.   Denies Personal Hx of Anesthesia complications.   Social:  Non-Smoker, No Alcohol Use    Hematology/Oncology:  Hematology Normal   Oncology Normal     EENT/Dental:EENT/Dental Normal   Cardiovascular:   Hypertension, well controlled CAD asymptomatic  ECG has been reviewed.    Pulmonary:  Pulmonary Normal    Renal/:  Renal/ Normal     Hepatic/GI:   GERD, well controlled    Musculoskeletal:   Arthritis     Neurological:  Neurology Normal    Endocrine:  Endocrine Normal    Dermatological:  Skin Normal    Psych:  Psychiatric Normal           Physical Exam  General: Cooperative, Alert and Oriented    Airway:  Mallampati: II   Mouth Opening: Normal  TM Distance: Normal  Tongue: Normal  Neck ROM: Normal ROM    Dental:  Intact        Anesthesia Plan  Type of Anesthesia, risks & benefits discussed:    Anesthesia Type: Gen Natural Airway  Intra-op Monitoring Plan: Standard ASA Monitors  Post Op Pain Control Plan: multimodal analgesia  Induction:  IV  Informed Consent: Informed consent signed with the Patient and all parties understand the risks and agree with anesthesia plan.  All questions answered. Patient consented to blood products? Yes  ASA Score: 3    Ready For Surgery From Anesthesia Perspective.     .

## 2023-06-28 ENCOUNTER — ANESTHESIA (OUTPATIENT)
Dept: SURGERY | Facility: HOSPITAL | Age: 78
End: 2023-06-28
Payer: MEDICARE

## 2023-06-28 ENCOUNTER — HOSPITAL ENCOUNTER (OUTPATIENT)
Facility: HOSPITAL | Age: 78
Discharge: HOME | End: 2023-06-28
Attending: SPECIALIST | Admitting: SPECIALIST
Payer: MEDICARE

## 2023-06-28 VITALS — HEART RATE: 74 BPM | OXYGEN SATURATION: 100 % | DIASTOLIC BLOOD PRESSURE: 72 MMHG | SYSTOLIC BLOOD PRESSURE: 128 MMHG

## 2023-06-28 VITALS
TEMPERATURE: 98 F | OXYGEN SATURATION: 96 % | RESPIRATION RATE: 18 BRPM | HEART RATE: 57 BPM | WEIGHT: 124 LBS | DIASTOLIC BLOOD PRESSURE: 60 MMHG | HEIGHT: 65 IN | SYSTOLIC BLOOD PRESSURE: 101 MMHG | BODY MASS INDEX: 20.66 KG/M2

## 2023-06-28 DIAGNOSIS — G56.01 CARPAL TUNNEL SYNDROME OF RIGHT WRIST: ICD-10-CM

## 2023-06-28 DIAGNOSIS — G56.01 CARPAL TUNNEL SYNDROME ON RIGHT: Primary | ICD-10-CM

## 2023-06-28 PROCEDURE — D9220A PRA ANESTHESIA: Mod: ,,, | Performed by: NURSE ANESTHETIST, CERTIFIED REGISTERED

## 2023-06-28 PROCEDURE — 36000706: Performed by: SPECIALIST

## 2023-06-28 PROCEDURE — 71000015 HC POSTOP RECOV 1ST HR: Performed by: SPECIALIST

## 2023-06-28 PROCEDURE — 37000009 HC ANESTHESIA EA ADD 15 MINS: Performed by: SPECIALIST

## 2023-06-28 PROCEDURE — D9220A PRA ANESTHESIA: ICD-10-PCS | Mod: ,,, | Performed by: NURSE ANESTHETIST, CERTIFIED REGISTERED

## 2023-06-28 PROCEDURE — 36000707: Performed by: SPECIALIST

## 2023-06-28 PROCEDURE — 37000008 HC ANESTHESIA 1ST 15 MINUTES: Performed by: SPECIALIST

## 2023-06-28 PROCEDURE — 63600175 PHARM REV CODE 636 W HCPCS: Performed by: ANESTHESIOLOGY

## 2023-06-28 PROCEDURE — 25000003 PHARM REV CODE 250: Performed by: NURSE ANESTHETIST, CERTIFIED REGISTERED

## 2023-06-28 PROCEDURE — 71000016 HC POSTOP RECOV ADDL HR: Performed by: SPECIALIST

## 2023-06-28 PROCEDURE — 63600175 PHARM REV CODE 636 W HCPCS

## 2023-06-28 PROCEDURE — 63600175 PHARM REV CODE 636 W HCPCS: Performed by: SPECIALIST

## 2023-06-28 PROCEDURE — 25000003 PHARM REV CODE 250: Performed by: SPECIALIST

## 2023-06-28 RX ORDER — DEXMEDETOMIDINE HYDROCHLORIDE 100 UG/ML
INJECTION, SOLUTION INTRAVENOUS
Status: DISCONTINUED | OUTPATIENT
Start: 2023-06-28 | End: 2023-06-28

## 2023-06-28 RX ORDER — GLYCOPYRROLATE 0.2 MG/ML
INJECTION INTRAMUSCULAR; INTRAVENOUS
Status: DISCONTINUED | OUTPATIENT
Start: 2023-06-28 | End: 2023-06-28

## 2023-06-28 RX ORDER — MORPHINE SULFATE 4 MG/ML
2 INJECTION, SOLUTION INTRAMUSCULAR; INTRAVENOUS EVERY 4 HOURS PRN
Status: DISCONTINUED | OUTPATIENT
Start: 2023-06-28 | End: 2023-06-28 | Stop reason: HOSPADM

## 2023-06-28 RX ORDER — ONDANSETRON 2 MG/ML
4 INJECTION INTRAMUSCULAR; INTRAVENOUS EVERY 12 HOURS PRN
Status: DISCONTINUED | OUTPATIENT
Start: 2023-06-28 | End: 2023-06-28 | Stop reason: HOSPADM

## 2023-06-28 RX ORDER — ONDANSETRON HYDROCHLORIDE 2 MG/ML
INJECTION, SOLUTION INTRAMUSCULAR; INTRAVENOUS
Status: DISCONTINUED | OUTPATIENT
Start: 2023-06-28 | End: 2023-06-28

## 2023-06-28 RX ORDER — BUPIVACAINE HYDROCHLORIDE 2.5 MG/ML
INJECTION, SOLUTION EPIDURAL; INFILTRATION; INTRACAUDAL
Status: DISCONTINUED | OUTPATIENT
Start: 2023-06-28 | End: 2023-06-28 | Stop reason: HOSPADM

## 2023-06-28 RX ORDER — CEFADROXIL 500 MG/1
500 CAPSULE ORAL EVERY 12 HOURS
Qty: 14 CAPSULE | Refills: 0 | Status: SHIPPED | OUTPATIENT
Start: 2023-06-28 | End: 2023-07-05

## 2023-06-28 RX ORDER — HYDROCODONE BITARTRATE AND ACETAMINOPHEN 10; 325 MG/1; MG/1
1 TABLET ORAL EVERY 6 HOURS PRN
Qty: 28 TABLET | Refills: 0 | Status: SHIPPED | OUTPATIENT
Start: 2023-06-28

## 2023-06-28 RX ORDER — CEFAZOLIN SODIUM 2 G/50ML
2 SOLUTION INTRAVENOUS
Status: COMPLETED | OUTPATIENT
Start: 2023-06-28 | End: 2023-06-28

## 2023-06-28 RX ORDER — SODIUM CHLORIDE, SODIUM LACTATE, POTASSIUM CHLORIDE, CALCIUM CHLORIDE 600; 310; 30; 20 MG/100ML; MG/100ML; MG/100ML; MG/100ML
INJECTION, SOLUTION INTRAVENOUS CONTINUOUS
Status: ACTIVE | OUTPATIENT
Start: 2023-06-28

## 2023-06-28 RX ORDER — KETAMINE HYDROCHLORIDE 50 MG/ML
INJECTION, SOLUTION INTRAMUSCULAR; INTRAVENOUS
Status: DISCONTINUED | OUTPATIENT
Start: 2023-06-28 | End: 2023-06-28

## 2023-06-28 RX ORDER — TRAMADOL HYDROCHLORIDE 50 MG/1
50 TABLET ORAL EVERY 6 HOURS PRN
Status: DISCONTINUED | OUTPATIENT
Start: 2023-06-28 | End: 2023-06-28 | Stop reason: HOSPADM

## 2023-06-28 RX ORDER — ESMOLOL HYDROCHLORIDE 10 MG/ML
INJECTION INTRAVENOUS
Status: DISCONTINUED | OUTPATIENT
Start: 2023-06-28 | End: 2023-06-28

## 2023-06-28 RX ORDER — OXYCODONE HYDROCHLORIDE 5 MG/1
10 TABLET ORAL EVERY 6 HOURS PRN
Status: DISCONTINUED | OUTPATIENT
Start: 2023-06-28 | End: 2023-06-28 | Stop reason: HOSPADM

## 2023-06-28 RX ORDER — LIDOCAINE HYDROCHLORIDE 20 MG/ML
INJECTION, SOLUTION INFILTRATION; PERINEURAL
Status: DISCONTINUED | OUTPATIENT
Start: 2023-06-28 | End: 2023-06-28 | Stop reason: HOSPADM

## 2023-06-28 RX ORDER — ONDANSETRON 4 MG/1
8 TABLET, ORALLY DISINTEGRATING ORAL EVERY 8 HOURS PRN
Status: DISCONTINUED | OUTPATIENT
Start: 2023-06-28 | End: 2023-06-28 | Stop reason: HOSPADM

## 2023-06-28 RX ADMIN — GLYCOPYRROLATE 0.2 MG: 0.2 INJECTION INTRAMUSCULAR; INTRAVENOUS at 10:06

## 2023-06-28 RX ADMIN — ESMOLOL HYDROCHLORIDE 30 MG: 100 INJECTION, SOLUTION INTRAVENOUS at 10:06

## 2023-06-28 RX ADMIN — KETAMINE HYDROCHLORIDE 25 MG: 50 INJECTION, SOLUTION, CONCENTRATE INTRAMUSCULAR; INTRAVENOUS at 10:06

## 2023-06-28 RX ADMIN — DEXMEDETOMIDINE 8 MCG: 200 INJECTION, SOLUTION INTRAVENOUS at 10:06

## 2023-06-28 RX ADMIN — SODIUM CHLORIDE, POTASSIUM CHLORIDE, SODIUM LACTATE AND CALCIUM CHLORIDE: 600; 310; 30; 20 INJECTION, SOLUTION INTRAVENOUS at 08:06

## 2023-06-28 RX ADMIN — ONDANSETRON HYDROCHLORIDE 4 MG: 2 INJECTION, SOLUTION INTRAMUSCULAR; INTRAVENOUS at 10:06

## 2023-06-28 RX ADMIN — CEFAZOLIN SODIUM 2 G: 2 SOLUTION INTRAVENOUS at 10:06

## 2023-06-28 NOTE — DISCHARGE SUMMARY
[facility] - Periop Services  Discharge Note  Short Stay    Procedure(s) (LRB):  RELEASE, CARPAL TUNNEL (Right)      OUTCOME: Patient tolerated treatment/procedure well without complication and is now ready for discharge.    DISPOSITION: Home or Self Care    FINAL DIAGNOSIS:  Carpal tunnel syndrome on right    FOLLOWUP: In clinic    DISCHARGE INSTRUCTIONS:    Discharge Procedure Orders   Diet general     Keep surgical extremity elevated     No driving, operating heavy equipment or signing legal documents while taking pain medication     Remove dressing in 72 hours   Order Comments: May remove splint in 3 days. Replace with band aids or other dry dressing     Call MD for:  temperature >100.4     Call MD for:  persistent nausea and vomiting     Call MD for:  severe uncontrolled pain     Call MD for:  difficulty breathing, headache or visual disturbances     Call MD for:  redness, tenderness, or signs of infection (pain, swelling, redness, odor or green/yellow discharge around incision site)     Call MD for:  hives     Call MD for:  persistent dizziness or light-headedness     Call MD for:  extreme fatigue     Lifting restrictions   Order Comments: No lifting with affected extremity     Weight bearing restrictions (specify)        TIME SPENT ON DISCHARGE: 20 minutes

## 2023-06-28 NOTE — PATIENT INSTRUCTIONS
Keep splint in place until day 3, then may remove and cover incision area with Band-Aid or other dry dressing.  No excessive range of motion of the wrist.  Follow-up in 2 weeks

## 2023-06-28 NOTE — OP NOTE
Operative note     Date: 6/28/2023     Preop diagnosis: Right carpal tunnel release    Postop diagnosis: same    Procedure: Right carpal tunnel release    Surgeon: Bayron Cordero M.D.    Assistant: .    Anesthesia: local    Complications: none    Blood loss: nil    Procedure in detail:  Informed consent was obtained.  Risks of the procedure was explained not excluding infection, bleeding, pain, scarring, neurovascular injury, worsening or persistence of symptoms.  Patient was brought to the OR and given IV sedation.  I did a local injection in the left palm of the hand in the carpal tunnel.  The patient was prepped and draped sterilely.  The tourniquet was inflated to 250. A longitudinal incision was made in the palm of the hand.  Under direct visualization the transverse carpal ligament was identified.  I carefully placed a blunt [place]lette elevator with in the carpal canal protecting the median nerve.  Using loupe magnification I transected the transverse carpal ligament which decompressed the median nerve.  It was inspected proximally and distally and found to be in continuity the wound was thoroughly irrigated.  The skin was closed with interrupted 4-0 nylon.  Sterile dressing and a volar splint was applied.  No complications.    Bayron Cordero M.D.

## 2023-07-19 ENCOUNTER — CLINICAL SUPPORT (OUTPATIENT)
Dept: RESPIRATORY THERAPY | Facility: HOSPITAL | Age: 78
End: 2023-07-19
Attending: INTERNAL MEDICINE
Payer: MEDICARE

## 2023-07-19 ENCOUNTER — LAB VISIT (OUTPATIENT)
Dept: LAB | Facility: HOSPITAL | Age: 78
End: 2023-07-19
Attending: INTERNAL MEDICINE
Payer: MEDICARE

## 2023-07-19 DIAGNOSIS — I10: ICD-10-CM

## 2023-07-19 DIAGNOSIS — I62.9: ICD-10-CM

## 2023-07-19 DIAGNOSIS — Z51.81 ENCOUNTER FOR MONITORING IMMUNOMODULATING THERAPY: ICD-10-CM

## 2023-07-19 DIAGNOSIS — Z01.818 OTHER SPECIFIED PRE-OPERATIVE EXAMINATION: Primary | ICD-10-CM

## 2023-07-19 DIAGNOSIS — Z01.818 OTHER SPECIFIED PRE-OPERATIVE EXAMINATION: ICD-10-CM

## 2023-07-19 DIAGNOSIS — Z79.899 ENCOUNTER FOR MONITORING IMMUNOMODULATING THERAPY: ICD-10-CM

## 2023-07-19 DIAGNOSIS — D50.8 OTHER IRON DEFICIENCY ANEMIA: Primary | ICD-10-CM

## 2023-07-19 LAB
ANION GAP SERPL CALC-SCNC: 6 MEQ/L (ref 2–13)
APTT PPP: 23.2 SECONDS (ref 23–29.4)
BASOPHILS # BLD AUTO: 0.07 X10(3)/MCL (ref 0.01–0.08)
BASOPHILS NFR BLD AUTO: 1.3 % (ref 0.1–1.2)
BUN SERPL-MCNC: 15 MG/DL (ref 7–20)
CALCIUM SERPL-MCNC: 9.5 MG/DL (ref 8.4–10.2)
CHLORIDE SERPL-SCNC: 107 MMOL/L (ref 98–110)
CO2 SERPL-SCNC: 27 MMOL/L (ref 21–32)
CREAT SERPL-MCNC: 0.69 MG/DL (ref 0.66–1.25)
CREAT/UREA NIT SERPL: 22 (ref 12–20)
EOSINOPHIL # BLD AUTO: 0.09 X10(3)/MCL (ref 0.04–0.36)
EOSINOPHIL NFR BLD AUTO: 1.6 % (ref 0.7–7)
ERYTHROCYTE [DISTWIDTH] IN BLOOD BY AUTOMATED COUNT: 14.9 % (ref 11–14.5)
GFR SERPLBLD CREATININE-BSD FMLA CKD-EPI: 88 MLS/MIN/1.73/M2
GLUCOSE SERPL-MCNC: 112 MG/DL (ref 70–115)
HCT VFR BLD AUTO: 28.1 % (ref 36–48)
HGB BLD-MCNC: 8.8 G/DL (ref 11.8–16)
IMM GRANULOCYTES # BLD AUTO: 0.05 X10(3)/MCL (ref 0–0.03)
IMM GRANULOCYTES NFR BLD AUTO: 0.9 % (ref 0–0.5)
INR BLD: 0.97
LYMPHOCYTES # BLD AUTO: 1.3 X10(3)/MCL (ref 1.16–3.74)
LYMPHOCYTES NFR BLD AUTO: 23.2 % (ref 20–55)
MCH RBC QN AUTO: 23.9 PG (ref 27–34)
MCHC RBC AUTO-ENTMCNC: 31.3 G/DL (ref 31–37)
MCV RBC AUTO: 76.4 FL (ref 79–99)
MONOCYTES # BLD AUTO: 0.72 X10(3)/MCL (ref 0.24–0.36)
MONOCYTES NFR BLD AUTO: 12.9 % (ref 4.7–12.5)
NEUTROPHILS # BLD AUTO: 3.37 X10(3)/MCL (ref 1.56–6.13)
NEUTROPHILS NFR BLD AUTO: 60.1 % (ref 37–73)
NRBC BLD AUTO-RTO: 0 %
PLATELET # BLD AUTO: 367 X10(3)/MCL (ref 140–371)
PMV BLD AUTO: 8.4 FL (ref 9.4–12.4)
POTASSIUM SERPL-SCNC: 5.2 MMOL/L (ref 3.5–5.1)
PROTHROMBIN TIME: 9.9 SECONDS (ref 9.3–11.9)
RBC # BLD AUTO: 3.68 X10(6)/MCL (ref 4–5.1)
SODIUM SERPL-SCNC: 140 MMOL/L (ref 135–145)
WBC # SPEC AUTO: 5.6 X10(3)/MCL (ref 4–11.5)

## 2023-07-19 PROCEDURE — 93005 ELECTROCARDIOGRAM TRACING: CPT

## 2023-07-19 PROCEDURE — 93010 EKG 12-LEAD: ICD-10-PCS | Mod: ,,, | Performed by: INTERNAL MEDICINE

## 2023-07-19 PROCEDURE — 85025 COMPLETE CBC W/AUTO DIFF WBC: CPT

## 2023-07-19 PROCEDURE — 93010 ELECTROCARDIOGRAM REPORT: CPT | Mod: ,,, | Performed by: INTERNAL MEDICINE

## 2023-07-19 PROCEDURE — 85730 THROMBOPLASTIN TIME PARTIAL: CPT

## 2023-07-19 PROCEDURE — 80048 BASIC METABOLIC PNL TOTAL CA: CPT

## 2023-07-19 PROCEDURE — 85610 PROTHROMBIN TIME: CPT

## 2023-07-19 PROCEDURE — 36415 COLL VENOUS BLD VENIPUNCTURE: CPT

## 2023-07-25 PROBLEM — D50.9 IRON DEFICIENCY ANEMIA: Status: ACTIVE | Noted: 2023-07-25

## 2023-07-25 RX ORDER — SODIUM CHLORIDE 0.9 % (FLUSH) 0.9 %
10 SYRINGE (ML) INJECTION
Status: CANCELLED | OUTPATIENT
Start: 2023-07-25

## 2023-07-25 RX ORDER — EPINEPHRINE 0.3 MG/.3ML
0.3 INJECTION SUBCUTANEOUS ONCE AS NEEDED
Status: CANCELLED | OUTPATIENT
Start: 2023-07-25

## 2023-07-25 RX ORDER — DIPHENHYDRAMINE HYDROCHLORIDE 50 MG/ML
50 INJECTION INTRAMUSCULAR; INTRAVENOUS ONCE AS NEEDED
Status: CANCELLED | OUTPATIENT
Start: 2023-07-25

## 2023-07-25 RX ORDER — SODIUM CHLORIDE 9 MG/ML
INJECTION, SOLUTION INTRAVENOUS CONTINUOUS
Status: CANCELLED | OUTPATIENT
Start: 2023-07-25

## 2023-08-03 ENCOUNTER — INFUSION (OUTPATIENT)
Dept: INFUSION THERAPY | Facility: HOSPITAL | Age: 78
End: 2023-08-03
Attending: INTERNAL MEDICINE
Payer: MEDICARE

## 2023-08-03 VITALS
SYSTOLIC BLOOD PRESSURE: 125 MMHG | TEMPERATURE: 97 F | HEART RATE: 71 BPM | RESPIRATION RATE: 18 BRPM | OXYGEN SATURATION: 98 % | DIASTOLIC BLOOD PRESSURE: 62 MMHG

## 2023-08-03 DIAGNOSIS — D50.8 OTHER IRON DEFICIENCY ANEMIA: Primary | ICD-10-CM

## 2023-08-03 PROCEDURE — 96365 THER/PROPH/DIAG IV INF INIT: CPT

## 2023-08-03 PROCEDURE — 25000003 PHARM REV CODE 250: Performed by: INTERNAL MEDICINE

## 2023-08-03 PROCEDURE — 63600175 PHARM REV CODE 636 W HCPCS: Mod: JZ,JG | Performed by: INTERNAL MEDICINE

## 2023-08-03 RX ORDER — SODIUM CHLORIDE 0.9 % (FLUSH) 0.9 %
10 SYRINGE (ML) INJECTION
Status: ACTIVE | OUTPATIENT
Start: 2023-08-03

## 2023-08-03 RX ORDER — SODIUM CHLORIDE 9 MG/ML
INJECTION, SOLUTION INTRAVENOUS CONTINUOUS
Status: CANCELLED | OUTPATIENT
Start: 2023-08-03

## 2023-08-03 RX ORDER — EPINEPHRINE 0.3 MG/.3ML
0.3 INJECTION SUBCUTANEOUS ONCE AS NEEDED
Status: CANCELLED | OUTPATIENT
Start: 2023-08-03

## 2023-08-03 RX ORDER — DIPHENHYDRAMINE HYDROCHLORIDE 50 MG/ML
50 INJECTION INTRAMUSCULAR; INTRAVENOUS ONCE AS NEEDED
Status: CANCELLED | OUTPATIENT
Start: 2023-08-03

## 2023-08-03 RX ORDER — DIPHENHYDRAMINE HYDROCHLORIDE 50 MG/ML
50 INJECTION INTRAMUSCULAR; INTRAVENOUS ONCE AS NEEDED
Status: ACTIVE | OUTPATIENT
Start: 2023-08-03 | End: 2034-12-29

## 2023-08-03 RX ORDER — SODIUM CHLORIDE 9 MG/ML
INJECTION, SOLUTION INTRAVENOUS CONTINUOUS
Status: ACTIVE | OUTPATIENT
Start: 2023-08-03

## 2023-08-03 RX ORDER — SODIUM CHLORIDE 0.9 % (FLUSH) 0.9 %
10 SYRINGE (ML) INJECTION
Status: CANCELLED | OUTPATIENT
Start: 2023-08-03

## 2023-08-03 RX ORDER — EPINEPHRINE 0.3 MG/.3ML
0.3 INJECTION SUBCUTANEOUS ONCE AS NEEDED
Status: ACTIVE | OUTPATIENT
Start: 2023-08-03 | End: 2034-12-29

## 2023-08-03 RX ADMIN — FERRIC CARBOXYMALTOSE INJECTION 750 MG: 50 INJECTION, SOLUTION INTRAVENOUS at 08:08

## 2023-08-03 NOTE — PLAN OF CARE
PT IN ROOM IN STABLE CONDITION, INJECTAFER GIVEN VIA IVPB. IV FLUSHED WITH NS, IV D/C'D WITH CATHETER TIP INTACT, APPLIED PRESSURE THEN COVERED WITH BAND-AID. PT TOLERATED WELL. INSTRUCTED PATIENT TO CALL PCP IF NEEDED.

## 2023-10-05 DIAGNOSIS — M25.511 RIGHT SHOULDER PAIN: Primary | ICD-10-CM

## 2023-10-10 ENCOUNTER — HOSPITAL ENCOUNTER (OUTPATIENT)
Dept: RADIOLOGY | Facility: HOSPITAL | Age: 78
Discharge: HOME | End: 2023-10-10
Attending: SPECIALIST
Payer: MEDICARE

## 2023-10-10 DIAGNOSIS — M25.511 RIGHT SHOULDER PAIN: ICD-10-CM

## 2023-10-10 PROCEDURE — 73221 MRI JOINT UPR EXTREM W/O DYE: CPT | Mod: TC,RT

## 2024-03-11 ENCOUNTER — HOSPITAL ENCOUNTER (OUTPATIENT)
Dept: RADIOLOGY | Facility: HOSPITAL | Age: 79
Discharge: HOME | End: 2024-03-11
Attending: INTERNAL MEDICINE
Payer: MEDICARE

## 2024-03-11 DIAGNOSIS — Z12.31 SCREENING MAMMOGRAM FOR BREAST CANCER: ICD-10-CM

## 2024-03-11 PROCEDURE — 77067 SCR MAMMO BI INCL CAD: CPT | Mod: TC

## 2024-06-20 ENCOUNTER — HOSPITAL ENCOUNTER (OUTPATIENT)
Dept: RADIOLOGY | Facility: HOSPITAL | Age: 79
Discharge: HOME | End: 2024-06-20
Attending: INTERNAL MEDICINE
Payer: MEDICARE

## 2024-06-20 DIAGNOSIS — R06.02 SHORTNESS OF BREATH: ICD-10-CM

## 2024-06-20 PROCEDURE — 71046 X-RAY EXAM CHEST 2 VIEWS: CPT | Mod: TC

## 2024-06-25 ENCOUNTER — HOSPITAL ENCOUNTER (OUTPATIENT)
Dept: CARDIOLOGY | Facility: HOSPITAL | Age: 79
Discharge: HOME | End: 2024-06-25
Attending: INTERNAL MEDICINE
Payer: MEDICARE

## 2024-06-25 ENCOUNTER — HOSPITAL ENCOUNTER (OUTPATIENT)
Dept: RADIOLOGY | Facility: HOSPITAL | Age: 79
Discharge: HOME | End: 2024-06-25
Attending: INTERNAL MEDICINE
Payer: MEDICARE

## 2024-06-25 VITALS — WEIGHT: 124 LBS | BODY MASS INDEX: 20.63 KG/M2

## 2024-06-25 DIAGNOSIS — I25.10 CORONARY ATHEROSCLEROSIS OF NATIVE CORONARY ARTERY: ICD-10-CM

## 2024-06-25 LAB
CV STRESS BASE HR: 64 BPM
DIASTOLIC BLOOD PRESSURE: 60 MMHG
EJECTION FRACTION- HIGH: 65 %
END DIASTOLIC INDEX-HIGH: 158 ML/M2
END DIASTOLIC INDEX-LOW: 94 ML/M2
END SYSTOLIC INDEX-HIGH: 71 ML/M2
END SYSTOLIC INDEX-LOW: 33 ML/M2
NUC REST DIASTOLIC VOLUME INDEX: 108
NUC REST EJECTION FRACTION: 35
NUC REST SYSTOLIC VOLUME INDEX: 70
NUC STRESS DIASTOLIC VOLUME INDEX: 110
NUC STRESS EJECTION FRACTION: 42 %
NUC STRESS SYSTOLIC VOLUME INDEX: 64
OHS CV CPX 85 PERCENT MAX PREDICTED HEART RATE MALE: 119
OHS CV CPX MAX PREDICTED HEART RATE: 140
OHS CV CPX PATIENT IS FEMALE: 1
OHS CV CPX PATIENT IS MALE: 0
OHS CV CPX PEAK DIASTOLIC BLOOD PRESSURE: 75 MMHG
OHS CV CPX PEAK HEAR RATE: 95 BPM
OHS CV CPX PEAK RATE PRESSURE PRODUCT: NORMAL
OHS CV CPX PEAK SYSTOLIC BLOOD PRESSURE: 149 MMHG
OHS CV CPX PERCENT MAX PREDICTED HEART RATE ACHIEVED: 70
OHS CV CPX RATE PRESSURE PRODUCT PRESENTING: 9088
RETIRED EF AND QEF - SEE NOTES: 53 %
SYSTOLIC BLOOD PRESSURE: 142 MMHG

## 2024-06-25 PROCEDURE — 63600175 PHARM REV CODE 636 W HCPCS: Performed by: INTERNAL MEDICINE

## 2024-06-25 PROCEDURE — 78452 HT MUSCLE IMAGE SPECT MULT: CPT

## 2024-06-25 PROCEDURE — A9500 TC99M SESTAMIBI: HCPCS | Performed by: INTERNAL MEDICINE

## 2024-06-25 PROCEDURE — 93017 CV STRESS TEST TRACING ONLY: CPT

## 2024-06-25 RX ORDER — REGADENOSON 0.08 MG/ML
0.4 INJECTION, SOLUTION INTRAVENOUS ONCE
Status: COMPLETED | OUTPATIENT
Start: 2024-06-25 | End: 2024-06-25

## 2024-06-25 RX ORDER — AMINOPHYLLINE 25 MG/ML
50 INJECTION, SOLUTION INTRAVENOUS
Status: DISCONTINUED | OUTPATIENT
Start: 2024-06-25 | End: 2024-06-26 | Stop reason: HOSPADM

## 2024-06-25 RX ORDER — TETRAKIS(2-METHOXYISOBUTYLISOCYANIDE)COPPER(I) TETRAFLUOROBORATE 1 MG/ML
30.4 INJECTION, POWDER, LYOPHILIZED, FOR SOLUTION INTRAVENOUS
Status: COMPLETED | OUTPATIENT
Start: 2024-06-25 | End: 2024-06-25

## 2024-06-25 RX ORDER — TETRAKIS(2-METHOXYISOBUTYLISOCYANIDE)COPPER(I) TETRAFLUOROBORATE 1 MG/ML
11.4 INJECTION, POWDER, LYOPHILIZED, FOR SOLUTION INTRAVENOUS
Status: COMPLETED | OUTPATIENT
Start: 2024-06-25 | End: 2024-06-25

## 2024-06-25 RX ADMIN — KIT FOR THE PREPARATION OF TECHNETIUM TC99M SESTAMIBI 30.4 MILLICURIE: 1 INJECTION, POWDER, LYOPHILIZED, FOR SOLUTION PARENTERAL at 09:06

## 2024-06-25 RX ADMIN — KIT FOR THE PREPARATION OF TECHNETIUM TC99M SESTAMIBI 11.4 MILLICURIE: 1 INJECTION, POWDER, LYOPHILIZED, FOR SOLUTION PARENTERAL at 08:06

## 2024-06-25 RX ADMIN — REGADENOSON 0.4 MG: 0.08 INJECTION, SOLUTION INTRAVENOUS at 09:06

## 2024-07-08 ENCOUNTER — LAB VISIT (OUTPATIENT)
Dept: LAB | Facility: HOSPITAL | Age: 79
End: 2024-07-08
Attending: INTERNAL MEDICINE
Payer: MEDICARE

## 2024-07-08 DIAGNOSIS — R53.83 FATIGUE, UNSPECIFIED TYPE: Primary | ICD-10-CM

## 2024-07-08 DIAGNOSIS — R42 DIZZINESS AND GIDDINESS: ICD-10-CM

## 2024-07-08 LAB
ANION GAP SERPL CALC-SCNC: 8 MEQ/L (ref 2–13)
BASOPHILS # BLD AUTO: 0.02 X10(3)/MCL (ref 0.01–0.08)
BASOPHILS NFR BLD AUTO: 0.3 % (ref 0.1–1.2)
BUN SERPL-MCNC: 23 MG/DL (ref 7–20)
CALCIUM SERPL-MCNC: 9.6 MG/DL (ref 8.4–10.2)
CHLORIDE SERPL-SCNC: 103 MMOL/L (ref 98–110)
CO2 SERPL-SCNC: 26 MMOL/L (ref 21–32)
CREAT SERPL-MCNC: 0.86 MG/DL (ref 0.66–1.25)
CREAT/UREA NIT SERPL: 27 (ref 12–20)
EOSINOPHIL # BLD AUTO: 0.15 X10(3)/MCL (ref 0.04–0.36)
EOSINOPHIL NFR BLD AUTO: 1.9 % (ref 0.7–7)
ERYTHROCYTE [DISTWIDTH] IN BLOOD BY AUTOMATED COUNT: 16.3 % (ref 11–14.5)
GFR SERPLBLD CREATININE-BSD FMLA CKD-EPI: 68 ML/MIN/1.73/M2
GLUCOSE SERPL-MCNC: 120 MG/DL (ref 70–115)
HCT VFR BLD AUTO: 24.4 % (ref 36–48)
HGB BLD-MCNC: 7.7 G/DL (ref 11.8–16)
IMM GRANULOCYTES # BLD AUTO: 0.07 X10(3)/MCL (ref 0–0.03)
IMM GRANULOCYTES NFR BLD AUTO: 0.9 % (ref 0–0.5)
LYMPHOCYTES # BLD AUTO: 1.33 X10(3)/MCL (ref 1.16–3.74)
LYMPHOCYTES NFR BLD AUTO: 17 % (ref 20–55)
MAGNESIUM SERPL-MCNC: 2.4 MG/DL (ref 1.8–2.4)
MCH RBC QN AUTO: 29.2 PG (ref 27–34)
MCHC RBC AUTO-ENTMCNC: 31.6 G/DL (ref 31–37)
MCV RBC AUTO: 92.4 FL (ref 79–99)
MONOCYTES # BLD AUTO: 0.85 X10(3)/MCL (ref 0.24–0.36)
MONOCYTES NFR BLD AUTO: 10.9 % (ref 4.7–12.5)
NEUTROPHILS # BLD AUTO: 5.41 X10(3)/MCL (ref 1.56–6.13)
NEUTROPHILS NFR BLD AUTO: 69 % (ref 37–73)
NRBC BLD AUTO-RTO: 0 %
PLATELET # BLD AUTO: 310 X10(3)/MCL (ref 140–371)
PMV BLD AUTO: 9.2 FL (ref 9.4–12.4)
POTASSIUM SERPL-SCNC: 4.6 MMOL/L (ref 3.5–5.1)
RBC # BLD AUTO: 2.64 X10(6)/MCL (ref 4–5.1)
SODIUM SERPL-SCNC: 137 MMOL/L (ref 136–145)
WBC # BLD AUTO: 7.83 X10(3)/MCL (ref 4–11.5)

## 2024-07-08 PROCEDURE — 83735 ASSAY OF MAGNESIUM: CPT

## 2024-07-08 PROCEDURE — 80048 BASIC METABOLIC PNL TOTAL CA: CPT

## 2024-07-08 PROCEDURE — 85025 COMPLETE CBC W/AUTO DIFF WBC: CPT

## 2024-07-08 PROCEDURE — 36415 COLL VENOUS BLD VENIPUNCTURE: CPT

## 2024-07-09 ENCOUNTER — HOSPITAL ENCOUNTER (INPATIENT)
Facility: HOSPITAL | Age: 79
LOS: 1 days | Discharge: HOME | DRG: 812 | End: 2024-07-10
Attending: FAMILY MEDICINE | Admitting: FAMILY MEDICINE
Payer: MEDICARE

## 2024-07-09 DIAGNOSIS — D62 ACUTE BLOOD LOSS ANEMIA: ICD-10-CM

## 2024-07-09 DIAGNOSIS — R07.9 CHEST PAIN: ICD-10-CM

## 2024-07-09 DIAGNOSIS — D64.9 ANEMIA: ICD-10-CM

## 2024-07-09 LAB
ABO AND RH: NORMAL
ABORH RETYPE: NORMAL
ALBUMIN SERPL-MCNC: 4.1 G/DL (ref 3.4–5)
ALBUMIN/GLOB SERPL: 1.9 RATIO
ALP SERPL-CCNC: 105 UNIT/L (ref 50–144)
ALT SERPL-CCNC: 19 UNIT/L (ref 1–45)
ANION GAP SERPL CALC-SCNC: 8 MEQ/L (ref 2–13)
ANTIBODY SCREEN: NORMAL
AST SERPL-CCNC: 29 UNIT/L (ref 14–36)
BASOPHILS # BLD AUTO: 0.03 X10(3)/MCL (ref 0.01–0.08)
BASOPHILS NFR BLD AUTO: 0.4 % (ref 0.1–1.2)
BILIRUB SERPL-MCNC: 0.4 MG/DL (ref 0–1)
BUN SERPL-MCNC: 33 MG/DL (ref 7–20)
CALCIUM SERPL-MCNC: 9.5 MG/DL (ref 8.4–10.2)
CHLORIDE SERPL-SCNC: 101 MMOL/L (ref 98–110)
CO2 SERPL-SCNC: 26 MMOL/L (ref 21–32)
CREAT SERPL-MCNC: 0.96 MG/DL (ref 0.66–1.25)
CREAT/UREA NIT SERPL: 34 (ref 12–20)
EOSINOPHIL # BLD AUTO: 0.15 X10(3)/MCL (ref 0.04–0.36)
EOSINOPHIL NFR BLD AUTO: 2.2 % (ref 0.7–7)
ERYTHROCYTE [DISTWIDTH] IN BLOOD BY AUTOMATED COUNT: 16 % (ref 11–14.5)
FERRITIN SERPL-MCNC: 12.7 NG/ML (ref 6.24–264)
FOLATE SERPL-MCNC: >20 NG/ML (ref 2.8–20)
GFR SERPLBLD CREATININE-BSD FMLA CKD-EPI: 60 ML/MIN/1.73/M2
GLOBULIN SER-MCNC: 2.2 GM/DL (ref 2–3.9)
GLUCOSE SERPL-MCNC: 104 MG/DL (ref 70–115)
HCT VFR BLD AUTO: 22.5 % (ref 36–48)
HCT VFR BLD AUTO: 23.5 % (ref 36–48)
HEMOCCULT SP1 STL QL: NEGATIVE
HGB BLD-MCNC: 7.2 G/DL (ref 11.8–16)
HGB BLD-MCNC: 7.6 G/DL (ref 11.8–16)
IMM GRANULOCYTES # BLD AUTO: 0.06 X10(3)/MCL (ref 0–0.03)
IMM GRANULOCYTES NFR BLD AUTO: 0.9 % (ref 0–0.5)
LYMPHOCYTES # BLD AUTO: 1.13 X10(3)/MCL (ref 1.16–3.74)
LYMPHOCYTES NFR BLD AUTO: 16.6 % (ref 20–55)
MCH RBC QN AUTO: 29.8 PG (ref 27–34)
MCHC RBC AUTO-ENTMCNC: 32.3 G/DL (ref 31–37)
MCV RBC AUTO: 92.2 FL (ref 79–99)
MONOCYTES # BLD AUTO: 0.96 X10(3)/MCL (ref 0.24–0.36)
MONOCYTES NFR BLD AUTO: 14.1 % (ref 4.7–12.5)
NEUTROPHILS # BLD AUTO: 4.46 X10(3)/MCL (ref 1.56–6.13)
NEUTROPHILS NFR BLD AUTO: 65.8 % (ref 37–73)
NRBC BLD AUTO-RTO: 0 %
PLATELET # BLD AUTO: 307 X10(3)/MCL (ref 140–371)
PMV BLD AUTO: 8.9 FL (ref 9.4–12.4)
POTASSIUM SERPL-SCNC: 4.9 MMOL/L (ref 3.5–5.1)
PROT SERPL-MCNC: 6.3 GM/DL (ref 6.3–8.2)
RBC # BLD AUTO: 2.55 X10(6)/MCL (ref 4–5.1)
SODIUM SERPL-SCNC: 135 MMOL/L (ref 136–145)
SPECIMEN OUTDATE: NORMAL
VIT B12 SERPL-MCNC: >1000 PG/ML (ref 211–946)
WBC # BLD AUTO: 6.79 X10(3)/MCL (ref 4–11.5)

## 2024-07-09 PROCEDURE — 25000003 PHARM REV CODE 250: Performed by: FAMILY MEDICINE

## 2024-07-09 PROCEDURE — 82728 ASSAY OF FERRITIN: CPT | Performed by: FAMILY MEDICINE

## 2024-07-09 PROCEDURE — 85018 HEMOGLOBIN: CPT | Performed by: FAMILY MEDICINE

## 2024-07-09 PROCEDURE — 82272 OCCULT BLD FECES 1-3 TESTS: CPT | Performed by: FAMILY MEDICINE

## 2024-07-09 PROCEDURE — 63600175 PHARM REV CODE 636 W HCPCS: Performed by: FAMILY MEDICINE

## 2024-07-09 PROCEDURE — 83540 ASSAY OF IRON: CPT | Performed by: FAMILY MEDICINE

## 2024-07-09 PROCEDURE — 25000003 PHARM REV CODE 250: Performed by: SURGERY

## 2024-07-09 PROCEDURE — 11000001 HC ACUTE MED/SURG PRIVATE ROOM

## 2024-07-09 PROCEDURE — 82607 VITAMIN B-12: CPT | Performed by: FAMILY MEDICINE

## 2024-07-09 PROCEDURE — 86900 BLOOD TYPING SEROLOGIC ABO: CPT | Performed by: FAMILY MEDICINE

## 2024-07-09 PROCEDURE — 36415 COLL VENOUS BLD VENIPUNCTURE: CPT | Performed by: FAMILY MEDICINE

## 2024-07-09 PROCEDURE — 85025 COMPLETE CBC W/AUTO DIFF WBC: CPT | Performed by: FAMILY MEDICINE

## 2024-07-09 PROCEDURE — 82746 ASSAY OF FOLIC ACID SERUM: CPT | Performed by: FAMILY MEDICINE

## 2024-07-09 PROCEDURE — 80053 COMPREHEN METABOLIC PANEL: CPT | Performed by: FAMILY MEDICINE

## 2024-07-09 PROCEDURE — 86850 RBC ANTIBODY SCREEN: CPT | Performed by: FAMILY MEDICINE

## 2024-07-09 PROCEDURE — 83550 IRON BINDING TEST: CPT | Performed by: FAMILY MEDICINE

## 2024-07-09 RX ORDER — IBUPROFEN 200 MG
24 TABLET ORAL
Status: DISCONTINUED | OUTPATIENT
Start: 2024-07-09 | End: 2024-07-10

## 2024-07-09 RX ORDER — ALUMINUM HYDROXIDE, MAGNESIUM HYDROXIDE, AND SIMETHICONE 1200; 120; 1200 MG/30ML; MG/30ML; MG/30ML
30 SUSPENSION ORAL 4 TIMES DAILY PRN
Status: DISCONTINUED | OUTPATIENT
Start: 2024-07-09 | End: 2024-07-10 | Stop reason: HOSPADM

## 2024-07-09 RX ORDER — AMOXICILLIN 250 MG
1 CAPSULE ORAL 2 TIMES DAILY
Status: DISCONTINUED | OUTPATIENT
Start: 2024-07-09 | End: 2024-07-10 | Stop reason: HOSPADM

## 2024-07-09 RX ORDER — GLUCAGON 1 MG
1 KIT INJECTION
Status: DISCONTINUED | OUTPATIENT
Start: 2024-07-09 | End: 2024-07-10

## 2024-07-09 RX ORDER — TALC
6 POWDER (GRAM) TOPICAL NIGHTLY PRN
Status: DISCONTINUED | OUTPATIENT
Start: 2024-07-09 | End: 2024-07-10 | Stop reason: HOSPADM

## 2024-07-09 RX ORDER — ONDANSETRON HYDROCHLORIDE 2 MG/ML
8 INJECTION, SOLUTION INTRAVENOUS EVERY 8 HOURS PRN
Status: DISCONTINUED | OUTPATIENT
Start: 2024-07-09 | End: 2024-07-10 | Stop reason: HOSPADM

## 2024-07-09 RX ORDER — NALOXONE HCL 0.4 MG/ML
0.02 VIAL (ML) INJECTION
Status: DISCONTINUED | OUTPATIENT
Start: 2024-07-09 | End: 2024-07-10 | Stop reason: HOSPADM

## 2024-07-09 RX ORDER — TRANEXAMIC ACID 10 MG/ML
1000 INJECTION, SOLUTION INTRAVENOUS ONCE
Status: COMPLETED | OUTPATIENT
Start: 2024-07-09 | End: 2024-07-09

## 2024-07-09 RX ORDER — ACETAMINOPHEN 325 MG/1
650 TABLET ORAL EVERY 4 HOURS PRN
Status: DISCONTINUED | OUTPATIENT
Start: 2024-07-09 | End: 2024-07-10 | Stop reason: HOSPADM

## 2024-07-09 RX ORDER — SODIUM,POTASSIUM PHOSPHATES 280-250MG
2 POWDER IN PACKET (EA) ORAL
Status: DISCONTINUED | OUTPATIENT
Start: 2024-07-09 | End: 2024-07-10 | Stop reason: HOSPADM

## 2024-07-09 RX ORDER — LANOLIN ALCOHOL/MO/W.PET/CERES
800 CREAM (GRAM) TOPICAL
Status: DISCONTINUED | OUTPATIENT
Start: 2024-07-09 | End: 2024-07-10 | Stop reason: HOSPADM

## 2024-07-09 RX ORDER — PANTOPRAZOLE SODIUM 40 MG/10ML
40 INJECTION, POWDER, LYOPHILIZED, FOR SOLUTION INTRAVENOUS 2 TIMES DAILY
Status: DISCONTINUED | OUTPATIENT
Start: 2024-07-09 | End: 2024-07-10 | Stop reason: HOSPADM

## 2024-07-09 RX ORDER — SODIUM CHLORIDE 0.9 % (FLUSH) 0.9 %
5 SYRINGE (ML) INJECTION
Status: DISCONTINUED | OUTPATIENT
Start: 2024-07-09 | End: 2024-07-10 | Stop reason: HOSPADM

## 2024-07-09 RX ORDER — IBUPROFEN 200 MG
16 TABLET ORAL
Status: DISCONTINUED | OUTPATIENT
Start: 2024-07-09 | End: 2024-07-10

## 2024-07-09 RX ADMIN — SENNOSIDES AND DOCUSATE SODIUM 1 TABLET: 8.6; 5 TABLET ORAL at 09:07

## 2024-07-09 RX ADMIN — TRANEXAMIC ACID 1000 MG: 10 INJECTION, SOLUTION INTRAVENOUS at 05:07

## 2024-07-09 RX ADMIN — PANTOPRAZOLE SODIUM 40 MG: 40 INJECTION, POWDER, LYOPHILIZED, FOR SOLUTION INTRAVENOUS at 05:07

## 2024-07-09 NOTE — HPI
77 yo F sent by Dr. Riley due to recent melanotic stool, noted labs done yesterday low Hgb 7.7  Pt noted no abdominal pain started with dark black stools on 07/04/2024 was dizzy this am, bp low 90/60 at PCP office sent for evaluation of bleeding and anemia. H/o GI bleed and PUD in the past was seen by GI in [place].  Denies pain prior to this.

## 2024-07-09 NOTE — PLAN OF CARE
Problem: Adult Inpatient Plan of Care  Goal: Plan of Care Review  Outcome: Progressing  Flowsheets (Taken 7/9/2024 1617)  Plan of Care Reviewed With:   patient   family  Goal: Patient-Specific Goal (Individualized)  Outcome: Progressing  Goal: Absence of Hospital-Acquired Illness or Injury  Outcome: Progressing  Intervention: Identify and Manage Fall Risk  Flowsheets (Taken 7/9/2024 1617)  Safety Promotion/Fall Prevention:   assistive device/personal item within reach   bed alarm set   Fall Risk reviewed with patient/family   Fall Risk signage in place   nonskid shoes/socks when out of bed   orthostatic vital signs   pulse ox   supervised activity   instructed to call staff for mobility  Intervention: Prevent Skin Injury  Flowsheets (Taken 7/9/2024 1617)  Body Position: position changed independently  Skin Protection: transparent dressing maintained  Device Skin Pressure Protection:   absorbent pad utilized/changed   tubing/devices free from skin contact  Intervention: Prevent and Manage VTE (Venous Thromboembolism) Risk  Flowsheets (Taken 7/9/2024 1617)  VTE Prevention/Management:   remove, assess skin, and reapply sequential compression device   ROM (active) performed  Intervention: Prevent Infection  Flowsheets (Taken 7/9/2024 1617)  Infection Prevention:   hand hygiene promoted   personal protective equipment utilized   rest/sleep promoted   single patient room provided  Goal: Optimal Comfort and Wellbeing  Outcome: Progressing  Intervention: Provide Person-Centered Care  Flowsheets (Taken 7/9/2024 1617)  Trust Relationship/Rapport:   care explained   choices provided   emotional support provided   empathic listening provided   questions answered   questions encouraged   thoughts/feelings acknowledged   reassurance provided  Goal: Readiness for Transition of Care  Outcome: Progressing     Problem: Wound  Goal: Optimal Coping  Outcome: Progressing  Intervention: Support Patient and Family Response  Flowsheets  (Taken 7/9/2024 1617)  Supportive Measures:   active listening utilized   counseling provided  Goal: Optimal Functional Ability  Outcome: Progressing  Intervention: Optimize Functional Ability  Flowsheets (Taken 7/9/2024 1617)  Activity Management: Ambulated to bathroom - L4  Activity Assistance Provided: assistance, stand-by  Goal: Absence of Infection Signs and Symptoms  Outcome: Progressing  Goal: Improved Oral Intake  Outcome: Progressing  Goal: Optimal Pain Control and Function  Outcome: Progressing  Intervention: Prevent or Manage Pain  Flowsheets (Taken 7/9/2024 1617)  Sleep/Rest Enhancement: consistent schedule promoted  Goal: Skin Health and Integrity  Outcome: Progressing  Intervention: Optimize Skin Protection  Flowsheets (Taken 7/9/2024 1617)  Skin Protection: transparent dressing maintained  Activity Management: Ambulated to bathroom - L4  Goal: Optimal Wound Healing  Outcome: Progressing  Intervention: Promote Wound Healing  Flowsheets (Taken 7/9/2024 1617)  Sleep/Rest Enhancement: consistent schedule promoted     Problem: Fatigue  Goal: Improved Activity Tolerance  Outcome: Progressing  Intervention: Promote Improved Energy  Flowsheets (Taken 7/9/2024 1617)  Sleep/Rest Enhancement: consistent schedule promoted  Activity Management: Ambulated to bathroom - L4  Environmental Support:   calm environment promoted   caregiver consistency promoted     Problem: Activity Intolerance  Goal: Enhanced Capacity and Energy  Outcome: Progressing  Intervention: Optimize Activity Tolerance  Flowsheets (Taken 7/9/2024 1617)  Self-Care Promotion: independence encouraged  Activity Management: Ambulated to bathroom - L4  Environmental Support:   calm environment promoted   caregiver consistency promoted     Problem: Gastrointestinal Bleeding  Goal: Optimal Coping with Acute Illness  Outcome: Progressing  Intervention: Optimize Psychosocial Response  Flowsheets (Taken 7/9/2024 1617)  Supportive Measures:   active  listening utilized   counseling provided  Goal: Hemostasis  Outcome: Progressing  Intervention: Manage Gastrointestinal Bleeding  Flowsheets (Taken 7/9/2024 1617)  Environmental Support:   calm environment promoted   caregiver consistency promoted     Problem: Fall Injury Risk  Goal: Absence of Fall and Fall-Related Injury  Outcome: Progressing  Intervention: Identify and Manage Contributors  Flowsheets (Taken 7/9/2024 1617)  Self-Care Promotion: independence encouraged  Intervention: Promote Injury-Free Environment  Flowsheets (Taken 7/9/2024 1617)  Safety Promotion/Fall Prevention:   assistive device/personal item within reach   bed alarm set   Fall Risk reviewed with patient/family   Fall Risk signage in place   nonskid shoes/socks when out of bed   orthostatic vital signs   pulse ox   supervised activity   instructed to call staff for mobility

## 2024-07-09 NOTE — ASSESSMENT & PLAN NOTE
Patient's anemia is currently stable. Has not received any PRBCs to date. Etiology likely d/t acute blood loss which was from GI  Current CBC reviewed-   Lab Results   Component Value Date    HGB 7.6 (L) 07/09/2024    HCT 23.5 (L) 07/09/2024     Monitor serial CBC and transfuse if patient becomes hemodynamically unstable, symptomatic or H/H drops below 7/21.  Will consult surgery for possible EGD  Start protonix bid iv

## 2024-07-09 NOTE — H&P
[facility]  History & Physical    Patient Name: Job Hancock  MRN: [id number]  Patient Class: IP- Inpatient  Admission Date: 7/9/2024  Attending Physician: Ayana Madrigal MD   Primary Care Provider: Loyd Riley MD         Patient information was obtained from patient and ER records.     Subjective:     Principal Problem:<principal problem not specified>    Acute blood loss anemia       Chief Complaint:   Chief Complaint   Patient presents with    Dizziness               HPI: 77 yo F sent by Dr. Riley due to recent melanotic stool, noted labs done yesterday low Hgb 7.7  Pt noted no abdominal pain started with dark black stools on 07/04/2024 was dizzy this am, bp low 90/60 at PCP office sent for evaluation of bleeding and anemia. H/o GI bleed and PUD in the past was seen by GI in [place].  Denies pain prior to this.    Past Medical History:   Diagnosis Date    Arthritis     GERD (gastroesophageal reflux disease)     Heart disease     High cholesterol     HTN (hypertension)     Primary osteoarthritis of first carpometacarpal joint of left hand 6/23/2022       Past Surgical History:   Procedure Laterality Date    BACK SURGERY      BUNIONECTOMY Right     CARPAL TUNNEL RELEASE Right 6/28/2023    Procedure: RELEASE, CARPAL TUNNEL;  Surgeon: Bayron Cordero MD;  Location: [facility];  Service: Orthopedics;  Laterality: Right;    CORONARY ANGIOPLASTY WITH STENT PLACEMENT      HYSTERECTOMY      INTERPOSITION ARTHROPLASTY OF CARPOMETACARPAL JOINTS Left 6/23/2022    Procedure: INTERPOSITION ARTHROPLASTY, CMC JOINT / Ex. of trapezium with FCR inter postion graft & tight rope;  Surgeon: Bayron Cordero MD;  Location: [facility] OR;  Service: Orthopedics;  Laterality: Left;       Review of patient's allergies indicates:  No Known Allergies    Current Facility-Administered Medications on File Prior to Encounter   Medication    lactated ringers infusion     Current  Outpatient Medications on File Prior to Encounter   Medication Sig    aspirin (ECOTRIN) 81 MG EC tablet Take 81 mg by mouth every morning. Stopped 6/27/23    carvediloL (COREG) 6.25 MG tablet Take 6.25 mg by mouth 2 (two) times daily.    ENTRESTO 49-51 mg per tablet Take 1 tablet by mouth 2 (two) times daily.    ezetimibe (ZETIA) 10 mg tablet Take 10 mg by mouth every morning.    furosemide (LASIX) 20 MG tablet Take 20 mg by mouth 2 (two) times a day.    nitroGLYCERIN (NITROSTAT) 0.4 MG SL tablet Place 0.4 mg under the tongue daily as needed.    pantoprazole (PROTONIX) 40 MG tablet Take 40 mg by mouth every morning.    potassium chloride SA (K-DUR,KLOR-CON) 20 MEQ tablet Take 20 mEq by mouth every morning.    rosuvastatin (CRESTOR) 5 MG tablet Take 5 mg by mouth every evening.    spironolactone (ALDACTONE) 25 MG tablet Take 25 mg by mouth every morning.    venlafaxine (EFFEXOR-XR) 75 MG 24 hr capsule Take 75 mg by mouth every morning.    [DISCONTINUED] HYDROcodone-acetaminophen (NORCO)  mg per tablet Take 1 tablet by mouth every 6 (six) hours as needed for Pain.    [DISCONTINUED] HYDROcodone-acetaminophen (NORCO)  mg per tablet Take 1 tablet by mouth every 6 (six) hours as needed for Pain.     Family History       Problem Relation (Age of Onset)    Breast cancer Mother    No Known Problems Father          Tobacco Use    Smoking status: Never    Smokeless tobacco: Never   Substance and Sexual Activity    Alcohol use: Not Currently    Drug use: Never    Sexual activity: Not on file     Review of Systems   Constitutional:  Negative for activity change, appetite change and fever.   Respiratory:  Negative for chest tightness, shortness of breath and wheezing.    Cardiovascular:  Negative for chest pain.   Gastrointestinal:  Negative for abdominal pain, constipation, diarrhea, nausea and vomiting.   Genitourinary:  Negative for dysuria.   Skin:  Negative for rash and wound.   Neurological:  Positive for  dizziness and light-headedness. Negative for tremors and headaches.     Objective:     Vital Signs (Most Recent):  Temp: 97.6 °F (36.4 °C) (07/09/24 1430)  Pulse: 67 (07/09/24 1430)  Resp: 18 (07/09/24 1430)  BP: (!) 111/51 (07/09/24 1534)  SpO2: 98 % (07/09/24 1430) Vital Signs (24h Range):  Temp:  [97.6 °F (36.4 °C)] 97.6 °F (36.4 °C)  Pulse:  [67] 67  Resp:  [18] 18  SpO2:  [98 %] 98 %  BP: (103-119)/(48-51) 111/51     Weight: 55.7 kg (122 lb 12.8 oz)  Body mass index is 20.43 kg/m².     Physical Exam  Vitals and nursing note reviewed. Exam conducted with a chaperone present.   Constitutional:       General: She is not in acute distress.     Appearance: Normal appearance. She is normal weight. She is not ill-appearing.   HENT:      Head: Normocephalic and atraumatic.   Eyes:      General: No scleral icterus.     Extraocular Movements: Extraocular movements intact.   Cardiovascular:      Rate and Rhythm: Normal rate and regular rhythm.      Pulses: Normal pulses.      Heart sounds: Normal heart sounds.   Pulmonary:      Effort: Pulmonary effort is normal.      Breath sounds: Normal breath sounds.   Abdominal:      General: Abdomen is flat. Bowel sounds are normal.      Palpations: Abdomen is soft.   Skin:     General: Skin is warm and dry.      Capillary Refill: Capillary refill takes less than 2 seconds.      Findings: No erythema, lesion or rash.   Neurological:      General: No focal deficit present.      Mental Status: She is alert and oriented to person, place, and time.   Psychiatric:         Mood and Affect: Mood normal.         Behavior: Behavior normal.         Thought Content: Thought content normal.                Significant Labs: All pertinent labs within the past 24 hours have been reviewed.  CBC:   Recent Labs   Lab 07/08/24  1142 07/09/24  1456   WBC 7.83 6.79   HGB 7.7* 7.6*   HCT 24.4* 23.5*    307     CMP:   Recent Labs   Lab 07/08/24  1142 07/09/24  1456    135*   K 4.6 4.9   CL  103 101   CO2 26 26   BUN 23* 33*   CREATININE 0.86 0.96   CALCIUM 9.6 9.5   ALBUMIN  --  4.1   BILITOT  --  0.4   ALKPHOS  --  105   AST  --  29   ALT  --  19       Significant Imaging: I have reviewed all pertinent imaging results/findings within the past 24 hours.  Assessment/Plan:     Acute blood loss anemia  Patient's anemia is currently stable. Has not received any PRBCs to date. Etiology likely d/t acute blood loss which was from GI  Current CBC reviewed-   Lab Results   Component Value Date    HGB 7.6 (L) 07/09/2024    HCT 23.5 (L) 07/09/2024     Monitor serial CBC and transfuse if patient becomes hemodynamically unstable, symptomatic or H/H drops below 7/21.  Will consult surgery for possible EGD  Start protonix bid iv      VTE Risk Mitigation (From admission, onward)           Ordered     IP VTE LOW RISK PATIENT  Once         07/09/24 1448     Place sequential compression device  Until discontinued         07/09/24 1448                       Patient Screened for food insecurity, housing instability, transportation needs, utility difficulties, and interpersonal safety.  No needs identified               Ayana Madrigal MD  Department of Hospital Medicine  [facility]

## 2024-07-09 NOTE — SUBJECTIVE & OBJECTIVE
Past Medical History:   Diagnosis Date    Arthritis     GERD (gastroesophageal reflux disease)     Heart disease     High cholesterol     HTN (hypertension)     Primary osteoarthritis of first carpometacarpal joint of left hand 6/23/2022       Past Surgical History:   Procedure Laterality Date    BACK SURGERY      BUNIONECTOMY Right     CARPAL TUNNEL RELEASE Right 6/28/2023    Procedure: RELEASE, CARPAL TUNNEL;  Surgeon: Bayron Cordero MD;  Location: [facility];  Service: Orthopedics;  Laterality: Right;    CORONARY ANGIOPLASTY WITH STENT PLACEMENT      HYSTERECTOMY      INTERPOSITION ARTHROPLASTY OF CARPOMETACARPAL JOINTS Left 6/23/2022    Procedure: INTERPOSITION ARTHROPLASTY, CMC JOINT / Ex. of trapezium with FCR inter postion graft & tight rope;  Surgeon: Bayron Cordero MD;  Location: [facility];  Service: Orthopedics;  Laterality: Left;       Review of patient's allergies indicates:  No Known Allergies    Current Facility-Administered Medications on File Prior to Encounter   Medication    lactated ringers infusion     Current Outpatient Medications on File Prior to Encounter   Medication Sig    aspirin (ECOTRIN) 81 MG EC tablet Take 81 mg by mouth every morning. Stopped 6/27/23    carvediloL (COREG) 6.25 MG tablet Take 6.25 mg by mouth 2 (two) times daily.    ENTRESTO 49-51 mg per tablet Take 1 tablet by mouth 2 (two) times daily.    ezetimibe (ZETIA) 10 mg tablet Take 10 mg by mouth every morning.    furosemide (LASIX) 20 MG tablet Take 20 mg by mouth 2 (two) times a day.    nitroGLYCERIN (NITROSTAT) 0.4 MG SL tablet Place 0.4 mg under the tongue daily as needed.    pantoprazole (PROTONIX) 40 MG tablet Take 40 mg by mouth every morning.    potassium chloride SA (K-DUR,KLOR-CON) 20 MEQ tablet Take 20 mEq by mouth every morning.    rosuvastatin (CRESTOR) 5 MG tablet Take 5 mg by mouth every evening.    spironolactone (ALDACTONE) 25 MG tablet Take 25 mg by mouth every morning.    venlafaxine (EFFEXOR-XR) 75  MG 24 hr capsule Take 75 mg by mouth every morning.    [DISCONTINUED] HYDROcodone-acetaminophen (NORCO)  mg per tablet Take 1 tablet by mouth every 6 (six) hours as needed for Pain.    [DISCONTINUED] HYDROcodone-acetaminophen (NORCO)  mg per tablet Take 1 tablet by mouth every 6 (six) hours as needed for Pain.     Family History       Problem Relation (Age of Onset)    Breast cancer Mother    No Known Problems Father          Tobacco Use    Smoking status: Never    Smokeless tobacco: Never   Substance and Sexual Activity    Alcohol use: Not Currently    Drug use: Never    Sexual activity: Not on file     Review of Systems   Constitutional:  Negative for activity change, appetite change and fever.   Respiratory:  Negative for chest tightness, shortness of breath and wheezing.    Cardiovascular:  Negative for chest pain.   Gastrointestinal:  Negative for abdominal pain, constipation, diarrhea, nausea and vomiting.   Genitourinary:  Negative for dysuria.   Skin:  Negative for rash and wound.   Neurological:  Positive for dizziness and light-headedness. Negative for tremors and headaches.     Objective:     Vital Signs (Most Recent):  Temp: 97.6 °F (36.4 °C) (07/09/24 1430)  Pulse: 67 (07/09/24 1430)  Resp: 18 (07/09/24 1430)  BP: (!) 111/51 (07/09/24 1534)  SpO2: 98 % (07/09/24 1430) Vital Signs (24h Range):  Temp:  [97.6 °F (36.4 °C)] 97.6 °F (36.4 °C)  Pulse:  [67] 67  Resp:  [18] 18  SpO2:  [98 %] 98 %  BP: (103-119)/(48-51) 111/51     Weight: 55.7 kg (122 lb 12.8 oz)  Body mass index is 20.43 kg/m².     Physical Exam  Vitals and nursing note reviewed. Exam conducted with a chaperone present.   Constitutional:       General: She is not in acute distress.     Appearance: Normal appearance. She is normal weight. She is not ill-appearing.   HENT:      Head: Normocephalic and atraumatic.   Eyes:      General: No scleral icterus.     Extraocular Movements: Extraocular movements intact.   Cardiovascular:       Rate and Rhythm: Normal rate and regular rhythm.      Pulses: Normal pulses.      Heart sounds: Normal heart sounds.   Pulmonary:      Effort: Pulmonary effort is normal.      Breath sounds: Normal breath sounds.   Abdominal:      General: Abdomen is flat. Bowel sounds are normal.      Palpations: Abdomen is soft.   Skin:     General: Skin is warm and dry.      Capillary Refill: Capillary refill takes less than 2 seconds.      Findings: No erythema, lesion or rash.   Neurological:      General: No focal deficit present.      Mental Status: She is alert and oriented to person, place, and time.   Psychiatric:         Mood and Affect: Mood normal.         Behavior: Behavior normal.         Thought Content: Thought content normal.                Significant Labs: All pertinent labs within the past 24 hours have been reviewed.  CBC:   Recent Labs   Lab 07/08/24  1142 07/09/24  1456   WBC 7.83 6.79   HGB 7.7* 7.6*   HCT 24.4* 23.5*    307     CMP:   Recent Labs   Lab 07/08/24  1142 07/09/24  1456    135*   K 4.6 4.9    101   CO2 26 26   BUN 23* 33*   CREATININE 0.86 0.96   CALCIUM 9.6 9.5   ALBUMIN  --  4.1   BILITOT  --  0.4   ALKPHOS  --  105   AST  --  29   ALT  --  19       Significant Imaging: I have reviewed all pertinent imaging results/findings within the past 24 hours.

## 2024-07-10 ENCOUNTER — ANESTHESIA (OUTPATIENT)
Dept: GASTROENTEROLOGY | Facility: HOSPITAL | Age: 79
DRG: 392 | End: 2024-07-10
Payer: MEDICARE

## 2024-07-10 ENCOUNTER — ANESTHESIA EVENT (OUTPATIENT)
Dept: GASTROENTEROLOGY | Facility: HOSPITAL | Age: 79
DRG: 392 | End: 2024-07-10
Payer: MEDICARE

## 2024-07-10 VITALS
HEIGHT: 65 IN | TEMPERATURE: 98 F | WEIGHT: 122.81 LBS | SYSTOLIC BLOOD PRESSURE: 98 MMHG | HEART RATE: 73 BPM | OXYGEN SATURATION: 99 % | BODY MASS INDEX: 20.46 KG/M2 | RESPIRATION RATE: 18 BRPM | DIASTOLIC BLOOD PRESSURE: 44 MMHG

## 2024-07-10 PROBLEM — I10 PRIMARY HYPERTENSION: Status: ACTIVE | Noted: 2024-07-10

## 2024-07-10 LAB
ALBUMIN SERPL-MCNC: 3.5 G/DL (ref 3.4–5)
ALBUMIN/GLOB SERPL: 1.7 RATIO
ALP SERPL-CCNC: 100 UNIT/L (ref 50–144)
ALT SERPL-CCNC: 15 UNIT/L (ref 1–45)
ANION GAP SERPL CALC-SCNC: 4 MEQ/L (ref 2–13)
AST SERPL-CCNC: 22 UNIT/L (ref 14–36)
BASOPHILS # BLD AUTO: 0.03 X10(3)/MCL (ref 0.01–0.08)
BASOPHILS NFR BLD AUTO: 0.6 % (ref 0.1–1.2)
BILIRUB SERPL-MCNC: 0.4 MG/DL (ref 0–1)
BUN SERPL-MCNC: 20 MG/DL (ref 7–20)
CALCIUM SERPL-MCNC: 9.4 MG/DL (ref 8.4–10.2)
CHLORIDE SERPL-SCNC: 104 MMOL/L (ref 98–110)
CO2 SERPL-SCNC: 25 MMOL/L (ref 21–32)
CREAT SERPL-MCNC: 0.88 MG/DL (ref 0.66–1.25)
CREAT/UREA NIT SERPL: 23 (ref 12–20)
EOSINOPHIL # BLD AUTO: 0.17 X10(3)/MCL (ref 0.04–0.36)
EOSINOPHIL NFR BLD AUTO: 3.3 % (ref 0.7–7)
ERYTHROCYTE [DISTWIDTH] IN BLOOD BY AUTOMATED COUNT: 15.7 % (ref 11–14.5)
GFR SERPLBLD CREATININE-BSD FMLA CKD-EPI: 67 ML/MIN/1.73/M2
GLOBULIN SER-MCNC: 2.1 GM/DL (ref 2–3.9)
GLUCOSE SERPL-MCNC: 109 MG/DL (ref 70–115)
HCT VFR BLD AUTO: 22.5 % (ref 36–48)
HEMOCCULT SP3 STL QL: NEGATIVE
HGB BLD-MCNC: 7.3 G/DL (ref 11.8–16)
IMM GRANULOCYTES # BLD AUTO: 0.05 X10(3)/MCL (ref 0–0.03)
IMM GRANULOCYTES NFR BLD AUTO: 1 % (ref 0–0.5)
IRON SATN MFR SERPL: 12 % (ref 20–50)
IRON SERPL-MCNC: 41 UG/DL (ref 50–170)
LYMPHOCYTES # BLD AUTO: 1.29 X10(3)/MCL (ref 1.16–3.74)
LYMPHOCYTES NFR BLD AUTO: 25 % (ref 20–55)
MAGNESIUM SERPL-MCNC: 2.3 MG/DL (ref 1.8–2.4)
MCH RBC QN AUTO: 29.8 PG (ref 27–34)
MCHC RBC AUTO-ENTMCNC: 32.4 G/DL (ref 31–37)
MCV RBC AUTO: 91.8 FL (ref 79–99)
MONOCYTES # BLD AUTO: 0.7 X10(3)/MCL (ref 0.24–0.36)
MONOCYTES NFR BLD AUTO: 13.6 % (ref 4.7–12.5)
NEUTROPHILS # BLD AUTO: 2.92 X10(3)/MCL (ref 1.56–6.13)
NEUTROPHILS NFR BLD AUTO: 56.5 % (ref 37–73)
NRBC BLD AUTO-RTO: 0 %
PLATELET # BLD AUTO: 278 X10(3)/MCL (ref 140–371)
PMV BLD AUTO: 8.9 FL (ref 9.4–12.4)
POCT GLUCOSE: 78 MG/DL (ref 70–110)
POTASSIUM SERPL-SCNC: 5 MMOL/L (ref 3.5–5.1)
PROT SERPL-MCNC: 5.6 GM/DL (ref 6.3–8.2)
RBC # BLD AUTO: 2.45 X10(6)/MCL (ref 4–5.1)
SODIUM SERPL-SCNC: 133 MMOL/L (ref 136–145)
TIBC SERPL-MCNC: 303 UG/DL (ref 70–310)
TIBC SERPL-MCNC: 344 UG/DL (ref 250–450)
TRANSFERRIN SERPL-MCNC: 330 MG/DL
WBC # BLD AUTO: 5.16 X10(3)/MCL (ref 4–11.5)

## 2024-07-10 PROCEDURE — 83735 ASSAY OF MAGNESIUM: CPT | Performed by: FAMILY MEDICINE

## 2024-07-10 PROCEDURE — 36415 COLL VENOUS BLD VENIPUNCTURE: CPT | Performed by: FAMILY MEDICINE

## 2024-07-10 PROCEDURE — 43251 EGD REMOVE LESION SNARE: CPT | Performed by: SURGERY

## 2024-07-10 PROCEDURE — 63600175 PHARM REV CODE 636 W HCPCS: Performed by: NURSE ANESTHETIST, CERTIFIED REGISTERED

## 2024-07-10 PROCEDURE — 63600175 PHARM REV CODE 636 W HCPCS: Performed by: FAMILY MEDICINE

## 2024-07-10 PROCEDURE — 25000003 PHARM REV CODE 250: Performed by: NURSE ANESTHETIST, CERTIFIED REGISTERED

## 2024-07-10 PROCEDURE — 0DB78ZX EXCISION OF STOMACH, PYLORUS, VIA NATURAL OR ARTIFICIAL OPENING ENDOSCOPIC, DIAGNOSTIC: ICD-10-PCS | Performed by: SURGERY

## 2024-07-10 PROCEDURE — 25000003 PHARM REV CODE 250: Performed by: FAMILY MEDICINE

## 2024-07-10 PROCEDURE — 88342 IMHCHEM/IMCYTCHM 1ST ANTB: CPT

## 2024-07-10 PROCEDURE — 0DB68ZX EXCISION OF STOMACH, VIA NATURAL OR ARTIFICIAL OPENING ENDOSCOPIC, DIAGNOSTIC: ICD-10-PCS | Performed by: SURGERY

## 2024-07-10 PROCEDURE — 80053 COMPREHEN METABOLIC PANEL: CPT | Performed by: FAMILY MEDICINE

## 2024-07-10 PROCEDURE — 82272 OCCULT BLD FECES 1-3 TESTS: CPT | Performed by: FAMILY MEDICINE

## 2024-07-10 PROCEDURE — 43239 EGD BIOPSY SINGLE/MULTIPLE: CPT | Mod: 59 | Performed by: SURGERY

## 2024-07-10 PROCEDURE — 85025 COMPLETE CBC W/AUTO DIFF WBC: CPT | Performed by: FAMILY MEDICINE

## 2024-07-10 PROCEDURE — S5010 5% DEXTROSE AND 0.45% SALINE: HCPCS | Performed by: NURSE ANESTHETIST, CERTIFIED REGISTERED

## 2024-07-10 PROCEDURE — A9560 TC99M LABELED RBC: HCPCS | Performed by: FAMILY MEDICINE

## 2024-07-10 PROCEDURE — 88305 TISSUE EXAM BY PATHOLOGIST: CPT | Performed by: SURGERY

## 2024-07-10 RX ORDER — EZETIMIBE 10 MG/1
10 TABLET ORAL EVERY MORNING
Status: DISCONTINUED | OUTPATIENT
Start: 2024-07-10 | End: 2024-07-10 | Stop reason: HOSPADM

## 2024-07-10 RX ORDER — PANTOPRAZOLE SODIUM 40 MG/1
40 TABLET, DELAYED RELEASE ORAL 2 TIMES DAILY
Qty: 60 TABLET | Refills: 1 | Status: SHIPPED | OUTPATIENT
Start: 2024-07-10

## 2024-07-10 RX ORDER — ATORVASTATIN CALCIUM 40 MG/1
40 TABLET, FILM COATED ORAL DAILY
Status: DISCONTINUED | OUTPATIENT
Start: 2024-07-10 | End: 2024-07-10 | Stop reason: HOSPADM

## 2024-07-10 RX ORDER — CARVEDILOL 6.25 MG/1
6.25 TABLET ORAL 2 TIMES DAILY
Status: DISCONTINUED | OUTPATIENT
Start: 2024-07-10 | End: 2024-07-10 | Stop reason: HOSPADM

## 2024-07-10 RX ORDER — SPIRONOLACTONE 25 MG/1
25 TABLET ORAL EVERY MORNING
Status: DISCONTINUED | OUTPATIENT
Start: 2024-07-10 | End: 2024-07-10 | Stop reason: HOSPADM

## 2024-07-10 RX ORDER — POTASSIUM CHLORIDE 20 MEQ/1
20 TABLET, EXTENDED RELEASE ORAL EVERY MORNING
Status: DISCONTINUED | OUTPATIENT
Start: 2024-07-10 | End: 2024-07-10 | Stop reason: HOSPADM

## 2024-07-10 RX ORDER — DEXTROSE MONOHYDRATE AND SODIUM CHLORIDE 5; .45 G/100ML; G/100ML
INJECTION, SOLUTION INTRAVENOUS CONTINUOUS PRN
Status: DISCONTINUED | OUTPATIENT
Start: 2024-07-10 | End: 2024-07-10

## 2024-07-10 RX ORDER — VENLAFAXINE HYDROCHLORIDE 75 MG/1
75 CAPSULE, EXTENDED RELEASE ORAL EVERY MORNING
Status: DISCONTINUED | OUTPATIENT
Start: 2024-07-10 | End: 2024-07-10 | Stop reason: HOSPADM

## 2024-07-10 RX ORDER — LIDOCAINE HYDROCHLORIDE 20 MG/ML
INJECTION INTRAVENOUS
Status: DISCONTINUED | OUTPATIENT
Start: 2024-07-10 | End: 2024-07-10

## 2024-07-10 RX ORDER — PROPOFOL 10 MG/ML
VIAL (ML) INTRAVENOUS
Status: DISCONTINUED | OUTPATIENT
Start: 2024-07-10 | End: 2024-07-10

## 2024-07-10 RX ORDER — FUROSEMIDE 20 MG/1
20 TABLET ORAL 2 TIMES DAILY
Status: DISCONTINUED | OUTPATIENT
Start: 2024-07-10 | End: 2024-07-10 | Stop reason: HOSPADM

## 2024-07-10 RX ADMIN — TECHNETIUM TC 99M-LABELED RED BLOOD CELLS 30 MILLICURIE: KIT at 07:07

## 2024-07-10 RX ADMIN — PROPOFOL 20 MG: 10 INJECTION, EMULSION INTRAVENOUS at 12:07

## 2024-07-10 RX ADMIN — IRON SUCROSE 100 MG: 20 INJECTION, SOLUTION INTRAVENOUS at 11:07

## 2024-07-10 RX ADMIN — DEXTROSE AND SODIUM CHLORIDE: 5; 450 INJECTION, SOLUTION INTRAVENOUS at 12:07

## 2024-07-10 RX ADMIN — CARVEDILOL 6.25 MG: 6.25 TABLET, FILM COATED ORAL at 11:07

## 2024-07-10 RX ADMIN — LIDOCAINE HYDROCHLORIDE 50 MG: 20 INJECTION, SOLUTION INTRAVENOUS at 12:07

## 2024-07-10 RX ADMIN — PROPOFOL 50 MG: 10 INJECTION, EMULSION INTRAVENOUS at 12:07

## 2024-07-10 RX ADMIN — PANTOPRAZOLE SODIUM 40 MG: 40 INJECTION, POWDER, LYOPHILIZED, FOR SOLUTION INTRAVENOUS at 08:07

## 2024-07-10 NOTE — ASSESSMENT & PLAN NOTE
Chronic, controlled. Latest blood pressure and vitals reviewed-     Temp:  [97.2 °F (36.2 °C)-98 °F (36.7 °C)]   Pulse:  [63-73]   Resp:  [17-18]   BP: ()/(47-57)   SpO2:  [96 %-100 %] .   Home meds for hypertension were reviewed and noted below.   Hypertension Medications               carvediloL (COREG) 6.25 MG tablet Take 6.25 mg by mouth 2 (two) times daily.    ENTRESTO 49-51 mg per tablet Take 1 tablet by mouth 2 (two) times daily.    furosemide (LASIX) 20 MG tablet Take 20 mg by mouth 2 (two) times a day.    nitroGLYCERIN (NITROSTAT) 0.4 MG SL tablet Place 0.4 mg under the tongue daily as needed.    spironolactone (ALDACTONE) 25 MG tablet Take 25 mg by mouth every morning.            While in the hospital, will manage blood pressure as follows; Continue home antihypertensive regimen    Will utilize p.r.n. blood pressure medication only if patient's blood pressure greater than 140/90 and she develops symptoms such as worsening chest pain or shortness of breath.

## 2024-07-10 NOTE — CONSULTS
Photos reviewed; skin tears noted.  Continue to treat with foam dressings and changes every Tuesday.

## 2024-07-10 NOTE — HOSPITAL COURSE
07/09/2024 pt stable, no pain, bm was more brown, less black, bleeding scan in progress this am, plans for EGD per surgery.  Hgb 7.3 this am  07/10/2024 DISCHARGE SUMMARY: pt admitted with low H&H and melena, Hgb stabel above 7, no further black stool, EGD showed gastritis and polyp which was removed with snare.  Pt ready for d/c home and will f/u with Dr. Mancilla for results.  Will increase protonix to BID and she will f/u with PCP.

## 2024-07-10 NOTE — PLAN OF CARE
Problem: Adult Inpatient Plan of Care  Goal: Plan of Care Review  Outcome: Progressing  Goal: Patient-Specific Goal (Individualized)  Outcome: Progressing  Goal: Absence of Hospital-Acquired Illness or Injury  Outcome: Progressing  Goal: Optimal Comfort and Wellbeing  Outcome: Progressing  Goal: Readiness for Transition of Care  Outcome: Progressing     Problem: Wound  Goal: Optimal Coping  Outcome: Progressing  Goal: Optimal Functional Ability  Outcome: Progressing  Goal: Absence of Infection Signs and Symptoms  Outcome: Progressing  Goal: Improved Oral Intake  Outcome: Progressing  Goal: Optimal Pain Control and Function  Outcome: Progressing  Goal: Skin Health and Integrity  Outcome: Progressing  Goal: Optimal Wound Healing  Outcome: Progressing     Problem: Fatigue  Goal: Improved Activity Tolerance  Outcome: Progressing     Problem: Activity Intolerance  Goal: Enhanced Capacity and Energy  Outcome: Progressing     Problem: Gastrointestinal Bleeding  Goal: Optimal Coping with Acute Illness  Outcome: Progressing  Goal: Hemostasis  Outcome: Progressing     Problem: Fall Injury Risk  Goal: Absence of Fall and Fall-Related Injury  Outcome: Progressing       PT RESTED WELL LAST PM. AMBULATED TO BATHROOM WITHOUT EXCESSIVE FATIGUE OR SOB. RECOVERS QUICKLY AFTER EXERTION. REMAINS NPO FOR PROCEDURE TODAY.

## 2024-07-10 NOTE — CONSULTS
Patient is a 78-year-old  female with a history of melanotic stools recently hemoglobin 7.7 with a hematocrit 23.5 and MCV of 92 MCH of 29.  Patient had a platelet count of 307.  Patient's hemoglobin hematocrit dropped to 7.2 and 22.5 with follow up H&H.  Patient was renal profile was unremarkable and stools for occult blood were negative.  24 hour bleeding scan is ordered.  I was consulted for endoscopic evaluation of the upper gastrointestinal tract possibly lower.  [facility]  she had an ulcer that was cauterized.  Patient did have colonoscopy done about 3 years ago by Dr. Riley was negative at that time.    No known drug allergies    Past medical history    1. Osteoarthritis of the knees feet and hands  2. Gastroesophageal reflux disease with a history of peptic ulcer disease in  status post EGD and cauterization   3. Heart attack with 2 stents placed 13 years ago and a defibrillator placed in  removed in  on aspirin patient does have congestive heart failure is on aspirin , carvedilol , nitroglycerin, Entresto  4. Hypercholesterolemia on Crestor, Zetia  5. Hypertension  6. 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 Anxious on Effexor     Past surgical history   1. Back surgery L1 cement placement 5 years ago by Dr. Cordero  2. Bunionectomy on the right side in 2024   3. Carpal tunnel release on 2023   4. Angioplasty with angiogram stent placement in    5. Pacemaker insertion in    6. Pacemaker removal with leads left behind in  patient did not need the pacemaker  7. Interposition arthroplasty of the metacarpophalangeal joint of the left hand on 2022 by Dr. Cordero   8.  x1   9.  total hysterectomy bilateral salpingo-oophorectomy  10. Mammogram in 2024   11. Colonoscopy 3 years ago by   Rosemary  12. EGD done in  at [facility] by [facility] with cauterization of GI bleed  13. Stress test and echo done by Dr. Weinstein on 2024 with a 53% ejection fraction  14. No angiogram done since     Immunizations   1. Tetanus shot over 5 years ago   2. Flu shot in 2023   3.  pneumonia shot in 2023.    4. COVID-19 shot x4   5. No shingle shot   6. No hepatitis shot      Family history   1. Mother had  of uterine cancer had breast cancer as well   2. Father  of a heart attack   3. Two brothers had a heart attack in her living   4. Son had leukemia  probably ALL she does not remember   Five.  Daughter had a brain stem injury secondary to birth    Social history   1. Quit smoking in her in the 1960s when she was in her 20s  2. Drank a glass per month of wine when she goes fishing for about 60 years   3. No drug abuse history   4. No shelter parole or probation   5. No  service   6. No rehab   7. Has a 12th grade level of education   8. Worked as a    9.  once is a  AB0 0 had a daughter that was a retired teacher in the room at the time my evaluation another daughter that is construction  manager also in the room at the time my evaluation patient has a another daughter that had a brain stem injury and is in a facility patient's son had leukemia passed away she had 1  3 normal spontaneous vaginal deliveries  10. Lives in [place]   11. Primary care provider is Dr. Loyd Riley    Review of systems   Twelve point review of systems otherwise unremarkable     Physical exam  Temp is 97° with a heart rate of 67 respiratory rate 18 blood pressure 111/51 saturation 98%  Patient was 5 ft 5-1/2 inches 122 lb   Patient wears glasses teeth are within normal limits she has not hard of hearing she has a sense of smell   Head ears nose throat is otherwise unremarkable   Heart is regular rate rhythm   Lungs are clear to auscultation    She has a pacemaker scar noted from removal and has lead still in place   Breasts have no masses adenopathy discharge  Abdomen is soft nontender nondistended lower midline scars noted from previous hysterectomy and    She has a bruising on her leg from a recent fall   She has poor pulses distally   Neurologically she was intact with no motor or sensory asymmetry     Laboratory studies   Noted above    Assessment   Patient is a 78-year-old  female with a history of melanotic stools recently hemoglobin 7.7 with a hematocrit 23.5 and MCV of 92 MCH of 29.  Patient had a platelet count of 307.  Patient's hemoglobin hematocrit dropped to 7.2 and 22.5 with follow up H&H.  Patient was renal profile was unremarkable and stools for occult blood were negative.  24 hour bleeding scan is ordered.  I was consulted for endoscopic evaluation of the upper gastrointestinal tract possibly lower.  Last EGD was at [facility] in  she had an ulcer that was cauterized.  Patient did have colonoscopy done about 3 years ago by Dr. Riley was negative at that time.    Plan   1. IV fluids   2. 24 hour bleeding scan   3. Check further stools for blood   4. EGD on 07/10/2024          .

## 2024-07-10 NOTE — PLAN OF CARE
Problem: Adult Inpatient Plan of Care  Goal: Plan of Care Review  Outcome: Met  Goal: Patient-Specific Goal (Individualized)  Outcome: Met  Goal: Absence of Hospital-Acquired Illness or Injury  Outcome: Met  Goal: Optimal Comfort and Wellbeing  Outcome: Met  Goal: Readiness for Transition of Care  Outcome: Met     Problem: Wound  Goal: Optimal Coping  Outcome: Met  Goal: Optimal Functional Ability  Outcome: Met  Goal: Absence of Infection Signs and Symptoms  Outcome: Met  Goal: Improved Oral Intake  Outcome: Met  Goal: Optimal Pain Control and Function  Outcome: Met  Goal: Skin Health and Integrity  Outcome: Met  Goal: Optimal Wound Healing  Outcome: Met     Problem: Fatigue  Goal: Improved Activity Tolerance  Outcome: Met     Problem: Activity Intolerance  Goal: Enhanced Capacity and Energy  Outcome: Met     Problem: Gastrointestinal Bleeding  Goal: Optimal Coping with Acute Illness  Outcome: Met  Goal: Hemostasis  Outcome: Met     Problem: Fall Injury Risk  Goal: Absence of Fall and Fall-Related Injury  Outcome: Met

## 2024-07-10 NOTE — PLAN OF CARE
Problem: Adult Inpatient Plan of Care  Goal: Plan of Care Review  Outcome: Progressing  Goal: Patient-Specific Goal (Individualized)  Outcome: Progressing  Goal: Absence of Hospital-Acquired Illness or Injury  Outcome: Progressing  Goal: Optimal Comfort and Wellbeing  Outcome: Progressing  Goal: Readiness for Transition of Care  Outcome: Progressing     Problem: Wound  Goal: Optimal Coping  Outcome: Progressing  Goal: Optimal Functional Ability  Outcome: Progressing  Goal: Absence of Infection Signs and Symptoms  Outcome: Progressing  Goal: Improved Oral Intake  Outcome: Progressing  Goal: Optimal Pain Control and Function  Outcome: Progressing  Goal: Skin Health and Integrity  Outcome: Progressing  Goal: Optimal Wound Healing  Outcome: Progressing     Problem: Fatigue  Goal: Improved Activity Tolerance  Outcome: Progressing     Problem: Activity Intolerance  Goal: Enhanced Capacity and Energy  Outcome: Progressing     Problem: Gastrointestinal Bleeding  Goal: Optimal Coping with Acute Illness  Outcome: Progressing  Goal: Hemostasis  Outcome: Progressing     Problem: Fall Injury Risk  Goal: Absence of Fall and Fall-Related Injury  Outcome: Progressing

## 2024-07-10 NOTE — ASSESSMENT & PLAN NOTE
Patient's anemia is currently stable. Has not received any PRBCs to date. Etiology likely d/t acute blood loss which was from GI  Current CBC reviewed-   Lab Results   Component Value Date    HGB 7.3 (L) 07/10/2024    HCT 22.5 (L) 07/10/2024     Monitor serial CBC and transfuse if patient becomes hemodynamically unstable, symptomatic or H/H drops below 7/21.  Continue  EGD today  Bleeding scan in progress  protonix bid iv

## 2024-07-10 NOTE — PLAN OF CARE
07/10/24 0832   Discharge Assessment   Assessment Type Discharge Planning Assessment   Confirmed/corrected address, phone number and insurance Yes   Confirmed Demographics Correct on Facesheet   Source of Information patient   When was your last doctors appointment? 07/09/24   Communicated ANAT with patient/caregiver Yes   Reason For Admission Acute Blood Loss Anemia   People in Home spouse   Facility Arrived From: Home   Do you expect to return to your current living situation? Yes   Prior to hospitilization cognitive status: Alert/Oriented   Current cognitive status: Alert/Oriented   Walking or Climbing Stairs Difficulty no   Dressing/Bathing Difficulty no   Equipment Currently Used at Home none   Readmission within 30 days? No   Patient currently being followed by outpatient case management? No   Do you currently have service(s) that help you manage your care at home? No   Do you take prescription medications? Yes   Do you have prescription coverage? Yes   Coverage Medicare   Do you have any problems affording any of your prescribed medications? No   Is the patient taking medications as prescribed? yes   How do you get to doctors appointments? car, drives self   Are you on dialysis? No   Do you take coumadin? No   Discharge Plan A Home   Discharge Plan B Home Health   DME Needed Upon Discharge  none   Discharge Plan discussed with: Patient   Transition of Care Barriers None   Physical Activity   On average, how many days per week do you engage in moderate to strenuous exercise (like a brisk walk)? 2 days   On average, how many minutes do you engage in exercise at this level? 10 min   Financial Resource Strain   How hard is it for you to pay for the very basics like food, housing, medical care, and heating? Not very   Housing Stability   In the last 12 months, was there a time when you were not able to pay the mortgage or rent on time? N   At any time in the past 12 months, were you homeless or living in a  shelter (including now)? N   Transportation Needs   Has the lack of transportation kept you from medical appointments, meetings, work or from getting things needed for daily living? No   Food Insecurity   Within the past 12 months, you worried that your food would run out before you got the money to buy more. Never true   Within the past 12 months, the food you bought just didn't last and you didn't have money to get more. Never true   Stress   Do you feel stress - tense, restless, nervous, or anxious, or unable to sleep at night because your mind is troubled all the time - these days? Only a littl   Social Isolation   How often do you feel lonely or isolated from those around you?  Never   Alcohol Use   Q1: How often do you have a drink containing alcohol? Monthly or l   Q2: How many drinks containing alcohol do you have on a typical day when you are drinking? 1 or 2   Q3: How often do you have six or more drinks on one occasion? Never   Utilities   In the past 12 months has the electric, gas, oil, or water company threatened to shut off services in your home? No   Health Literacy   How often do you need to have someone help you when you read instructions, pamphlets, or other written material from your doctor or pharmacy? Never   OTHER   Name(s) of People in Home Cesario Hancock

## 2024-07-10 NOTE — ANESTHESIA POSTPROCEDURE EVALUATION
Anesthesia Post Evaluation    Patient: Job Hancock    Procedure(s) Performed: * No procedures listed *    Final Anesthesia Type: MAC      Patient location during evaluation: GI PACU  Patient participation: Yes- Able to Participate  Level of consciousness: awake and alert, awake and oriented  Post-procedure vital signs: reviewed and stable  Pain management: adequate  Airway patency: patent    PONV status at discharge: No PONV  Anesthetic complications: no      Cardiovascular status: blood pressure returned to baseline  Respiratory status: unassisted, room air and spontaneous ventilation  Hydration status: euvolemic  Follow-up not needed.              Vitals Value Taken Time   /56 07/10/24 1109   Temp 36.8 °C (98.2 °F) 07/10/24 1105   Pulse 67 07/10/24 1105   Resp 18 07/10/24 1105   SpO2 96 % 07/10/24 1105         No case tracking events are documented in the log.      Pain/Neli Score: No data recorded

## 2024-07-10 NOTE — SUBJECTIVE & OBJECTIVE
Past Medical History:   Diagnosis Date    Anxiety disorder, unspecified     Arthritis     CHF (congestive heart failure)     Gastric ulcer     GERD (gastroesophageal reflux disease)     Heart disease     High cholesterol     History of heart artery stent     HTN (hypertension)     Primary osteoarthritis of first carpometacarpal joint of left hand 06/23/2022       Past Surgical History:   Procedure Laterality Date    BACK SURGERY      BUNIONECTOMY Right     CARPAL TUNNEL RELEASE Right 6/28/2023    Procedure: RELEASE, CARPAL TUNNEL;  Surgeon: Bayron Cordero MD;  Location: [facility];  Service: Orthopedics;  Laterality: Right;    CORONARY ANGIOPLASTY WITH STENT PLACEMENT      HYSTERECTOMY      INTERPOSITION ARTHROPLASTY OF CARPOMETACARPAL JOINTS Left 6/23/2022    Procedure: INTERPOSITION ARTHROPLASTY, CMC JOINT / Ex. of trapezium with FCR inter postion graft & tight rope;  Surgeon: Bayron Cordero MD;  Location: [facility];  Service: Orthopedics;  Laterality: Left;       Review of patient's allergies indicates:  No Known Allergies    Current Facility-Administered Medications on File Prior to Encounter   Medication    lactated ringers infusion     Current Outpatient Medications on File Prior to Encounter   Medication Sig    aspirin (ECOTRIN) 81 MG EC tablet Take 81 mg by mouth every morning. Stopped 6/27/23    carvediloL (COREG) 6.25 MG tablet Take 6.25 mg by mouth 2 (two) times daily.    ENTRESTO 49-51 mg per tablet Take 1 tablet by mouth 2 (two) times daily.    ezetimibe (ZETIA) 10 mg tablet Take 10 mg by mouth every morning.    furosemide (LASIX) 20 MG tablet Take 20 mg by mouth 2 (two) times a day.    nitroGLYCERIN (NITROSTAT) 0.4 MG SL tablet Place 0.4 mg under the tongue daily as needed.    pantoprazole (PROTONIX) 40 MG tablet Take 40 mg by mouth every morning.    potassium chloride SA (K-DUR,KLOR-CON) 20 MEQ tablet Take 20 mEq by mouth every morning.    rosuvastatin (CRESTOR) 5 MG tablet Take 5 mg by mouth  every evening.    spironolactone (ALDACTONE) 25 MG tablet Take 25 mg by mouth every morning.    venlafaxine (EFFEXOR-XR) 75 MG 24 hr capsule Take 75 mg by mouth every morning.     Family History       Problem Relation (Age of Onset)    Breast cancer Mother    No Known Problems Father          Tobacco Use    Smoking status: Never    Smokeless tobacco: Never   Substance and Sexual Activity    Alcohol use: Not Currently    Drug use: Never    Sexual activity: Not on file     Review of Systems   Constitutional:  Negative for activity change, appetite change and fever.   Respiratory:  Negative for chest tightness, shortness of breath and wheezing.    Cardiovascular:  Negative for chest pain.   Gastrointestinal:  Negative for abdominal pain, constipation, diarrhea, nausea and vomiting.   Genitourinary:  Negative for dysuria.   Skin:  Negative for rash and wound.   Neurological:  Positive for dizziness and light-headedness. Negative for tremors and headaches.     Objective:     Vital Signs (Most Recent):  Temp: 97.8 °F (36.6 °C) (07/10/24 0830)  Pulse: 63 (07/10/24 0830)  Resp: 18 (07/10/24 0830)  BP: (!) 114/57 (07/10/24 0830)  SpO2: 98 % (07/10/24 0830) Vital Signs (24h Range):  Temp:  [97.2 °F (36.2 °C)-98 °F (36.7 °C)] 97.8 °F (36.6 °C)  Pulse:  [63-73] 63  Resp:  [17-18] 18  SpO2:  [96 %-100 %] 98 %  BP: ()/(47-57) 114/57     Weight: 55.7 kg (122 lb 12.8 oz)  Body mass index is 20.43 kg/m².     Physical Exam  Vitals and nursing note reviewed. Exam conducted with a chaperone present.   Constitutional:       General: She is not in acute distress.     Appearance: Normal appearance. She is normal weight. She is not ill-appearing.   HENT:      Head: Normocephalic and atraumatic.   Eyes:      General: No scleral icterus.     Extraocular Movements: Extraocular movements intact.   Cardiovascular:      Rate and Rhythm: Normal rate and regular rhythm.      Pulses: Normal pulses.      Heart sounds: Normal heart sounds.    Pulmonary:      Effort: Pulmonary effort is normal.      Breath sounds: Normal breath sounds.   Abdominal:      General: Abdomen is flat. Bowel sounds are normal.      Palpations: Abdomen is soft.   Skin:     General: Skin is warm and dry.      Capillary Refill: Capillary refill takes less than 2 seconds.      Findings: No erythema, lesion or rash.   Neurological:      General: No focal deficit present.      Mental Status: She is alert and oriented to person, place, and time.   Psychiatric:         Mood and Affect: Mood normal.         Behavior: Behavior normal.         Thought Content: Thought content normal.                Significant Labs: All pertinent labs within the past 24 hours have been reviewed.  CBC:   Recent Labs   Lab 07/08/24  1142 07/09/24  1456 07/09/24  1950 07/10/24  0445   WBC 7.83 6.79  --  5.16   HGB 7.7* 7.6* 7.2* 7.3*   HCT 24.4* 23.5* 22.5* 22.5*    307  --  278     CMP:   Recent Labs   Lab 07/08/24  1142 07/09/24  1456 07/10/24  0445    135* 133*   K 4.6 4.9 5.0    101 104   CO2 26 26 25   BUN 23* 33* 20   CREATININE 0.86 0.96 0.88   CALCIUM 9.6 9.5 9.4   ALBUMIN  --  4.1 3.5   BILITOT  --  0.4 0.4   ALKPHOS  --  105 100   AST  --  29 22   ALT  --  19 15       Significant Imaging: I have reviewed all pertinent imaging results/findings within the past 24 hours.

## 2024-07-10 NOTE — PLAN OF CARE
07/10/24 1529   Final Note   Assessment Type Final Discharge Note   Anticipated Discharge Disposition Home   What phone number can be called within the next 1-3 days to see how you are doing after discharge? [phone]   Hospital Resources/Appts/Education Provided Appointments scheduled and added to AVS   Post-Acute Status   Coverage Medicare   Discharge Delays None known at this time

## 2024-07-10 NOTE — DISCHARGE SUMMARY
[facility]-Med/Surg  Discharge Note  Short Stay    * No procedures listed *      OUTCOME: Patient tolerated treatment/procedure well without complication and is now ready for discharge.    DISPOSITION: Home or Self Care    FINAL DIAGNOSIS:  Acute blood loss anemia  Duodenal in all 4 portions and into the jejunum   Biopsy of the antrum taken for histo path and H pylori   Normal antrum fundus and cardia of the stomach   with a large polyp 2 cm in the midbody of the stomach removed with a hot loop snare clear margin obtained no ink spot necessary  Z-line at 37 cm  Hiatal hernia from 37 to 39 cm  Severe gastroesophageal reflux of the distal 1/3 of the esophagus extending from 30-37 cm  Normal midbody and proximal esophagus cricopharyngeus muscle vocal cords  FOLLOWUP: In clinic one week    DISCHARGE INSTRUCTIONS:  No discharge procedures on file.     TIME SPENT ON DISCHARGE:  5 minutes

## 2024-07-10 NOTE — PROGRESS NOTES
[facility]/Surg  [facility] Medicine  Progress Note    Patient Name: Job Hancock  MRN: [id number]  Patient Class: IP- Inpatient   Admission Date: 7/9/2024  Length of Stay: 1 days  Attending Physician: Ayana Madrigal MD  Primary Care Provider: Loyd Riley MD        Subjective:     Principal Problem:Acute blood loss anemia        HPI:  79 yo F sent by Dr. Riley due to recent melanotic stool, noted labs done yesterday low Hgb 7.7  Pt noted no abdominal pain started with dark black stools on 07/04/2024 was dizzy this am, bp low 90/60 at PCP office sent for evaluation of bleeding and anemia. H/o GI bleed and PUD in the past was seen by GI in [place].  Denies pain prior to this.    Overview/Hospital Course:   07/10/2024 pt stable, no pain, bm was more brown, less black, bleeding scan in progress this am, plans for EGD per surgery.  Hgb 7.3 this am    Past Medical History:   Diagnosis Date    Anxiety disorder, unspecified     Arthritis     CHF (congestive heart failure)     Gastric ulcer     GERD (gastroesophageal reflux disease)     Heart disease     High cholesterol     History of heart artery stent     HTN (hypertension)     Primary osteoarthritis of first carpometacarpal joint of left hand 06/23/2022       Past Surgical History:   Procedure Laterality Date    BACK SURGERY      BUNIONECTOMY Right     CARPAL TUNNEL RELEASE Right 6/28/2023    Procedure: RELEASE, CARPAL TUNNEL;  Surgeon: Bayron Cordero MD;  Location: [facility];  Service: Orthopedics;  Laterality: Right;    CORONARY ANGIOPLASTY WITH STENT PLACEMENT      HYSTERECTOMY      INTERPOSITION ARTHROPLASTY OF CARPOMETACARPAL JOINTS Left 6/23/2022    Procedure: INTERPOSITION ARTHROPLASTY, CMC JOINT / Ex. of trapezium with FCR inter postion graft & tight rope;  Surgeon: Bayron Cordero MD;  Location: [facility] OR;  Service: Orthopedics;  Laterality: Left;       Review of patient's allergies indicates:  No Known Allergies    Current  Facility-Administered Medications on File Prior to Encounter   Medication    lactated ringers infusion     Current Outpatient Medications on File Prior to Encounter   Medication Sig    aspirin (ECOTRIN) 81 MG EC tablet Take 81 mg by mouth every morning. Stopped 6/27/23    carvediloL (COREG) 6.25 MG tablet Take 6.25 mg by mouth 2 (two) times daily.    ENTRESTO 49-51 mg per tablet Take 1 tablet by mouth 2 (two) times daily.    ezetimibe (ZETIA) 10 mg tablet Take 10 mg by mouth every morning.    furosemide (LASIX) 20 MG tablet Take 20 mg by mouth 2 (two) times a day.    nitroGLYCERIN (NITROSTAT) 0.4 MG SL tablet Place 0.4 mg under the tongue daily as needed.    pantoprazole (PROTONIX) 40 MG tablet Take 40 mg by mouth every morning.    potassium chloride SA (K-DUR,KLOR-CON) 20 MEQ tablet Take 20 mEq by mouth every morning.    rosuvastatin (CRESTOR) 5 MG tablet Take 5 mg by mouth every evening.    spironolactone (ALDACTONE) 25 MG tablet Take 25 mg by mouth every morning.    venlafaxine (EFFEXOR-XR) 75 MG 24 hr capsule Take 75 mg by mouth every morning.     Family History       Problem Relation (Age of Onset)    Breast cancer Mother    No Known Problems Father          Tobacco Use    Smoking status: Never    Smokeless tobacco: Never   Substance and Sexual Activity    Alcohol use: Not Currently    Drug use: Never    Sexual activity: Not on file     Review of Systems   Constitutional:  Negative for activity change, appetite change and fever.   Respiratory:  Negative for chest tightness, shortness of breath and wheezing.    Cardiovascular:  Negative for chest pain.   Gastrointestinal:  Negative for abdominal pain, constipation, diarrhea, nausea and vomiting.   Genitourinary:  Negative for dysuria.   Skin:  Negative for rash and wound.   Neurological:  Positive for dizziness and light-headedness. Negative for tremors and headaches.     Objective:     Vital Signs (Most Recent):  Temp: 97.8 °F (36.6 °C) (07/10/24 0830)  Pulse:  63 (07/10/24 0830)  Resp: 18 (07/10/24 0830)  BP: (!) 114/57 (07/10/24 0830)  SpO2: 98 % (07/10/24 0830) Vital Signs (24h Range):  Temp:  [97.2 °F (36.2 °C)-98 °F (36.7 °C)] 97.8 °F (36.6 °C)  Pulse:  [63-73] 63  Resp:  [17-18] 18  SpO2:  [96 %-100 %] 98 %  BP: ()/(47-57) 114/57     Weight: 55.7 kg (122 lb 12.8 oz)  Body mass index is 20.43 kg/m².     Physical Exam  Vitals and nursing note reviewed. Exam conducted with a chaperone present.   Constitutional:       General: She is not in acute distress.     Appearance: Normal appearance. She is normal weight. She is not ill-appearing.   HENT:      Head: Normocephalic and atraumatic.   Eyes:      General: No scleral icterus.     Extraocular Movements: Extraocular movements intact.   Cardiovascular:      Rate and Rhythm: Normal rate and regular rhythm.      Pulses: Normal pulses.      Heart sounds: Normal heart sounds.   Pulmonary:      Effort: Pulmonary effort is normal.      Breath sounds: Normal breath sounds.   Abdominal:      General: Abdomen is flat. Bowel sounds are normal.      Palpations: Abdomen is soft.   Skin:     General: Skin is warm and dry.      Capillary Refill: Capillary refill takes less than 2 seconds.      Findings: No erythema, lesion or rash.   Neurological:      General: No focal deficit present.      Mental Status: She is alert and oriented to person, place, and time.   Psychiatric:         Mood and Affect: Mood normal.         Behavior: Behavior normal.         Thought Content: Thought content normal.                Significant Labs: All pertinent labs within the past 24 hours have been reviewed.  CBC:   Recent Labs   Lab 07/08/24  1142 07/09/24  1456 07/09/24  1950 07/10/24  0445   WBC 7.83 6.79  --  5.16   HGB 7.7* 7.6* 7.2* 7.3*   HCT 24.4* 23.5* 22.5* 22.5*    307  --  278     CMP:   Recent Labs   Lab 07/08/24  1142 07/09/24  1456 07/10/24  0445    135* 133*   K 4.6 4.9 5.0    101 104   CO2 26 26 25   BUN 23* 33* 20    CREATININE 0.86 0.96 0.88   CALCIUM 9.6 9.5 9.4   ALBUMIN  --  4.1 3.5   BILITOT  --  0.4 0.4   ALKPHOS  --  105 100   AST  --  29 22   ALT  --  19 15       Significant Imaging: I have reviewed all pertinent imaging results/findings within the past 24 hours.    Assessment/Plan:      * Acute blood loss anemia  Patient's anemia is currently stable. Has not received any PRBCs to date. Etiology likely d/t acute blood loss which was from GI  Current CBC reviewed-   Lab Results   Component Value Date    HGB 7.3 (L) 07/10/2024    HCT 22.5 (L) 07/10/2024     Monitor serial CBC and transfuse if patient becomes hemodynamically unstable, symptomatic or H/H drops below 7/21.  Continue  EGD today  Bleeding scan in progress  protonix bid iv    Primary hypertension  Chronic, controlled. Latest blood pressure and vitals reviewed-     Temp:  [97.2 °F (36.2 °C)-98 °F (36.7 °C)]   Pulse:  [63-73]   Resp:  [17-18]   BP: ()/(47-57)   SpO2:  [96 %-100 %] .   Home meds for hypertension were reviewed and noted below.   Hypertension Medications               carvediloL (COREG) 6.25 MG tablet Take 6.25 mg by mouth 2 (two) times daily.    ENTRESTO 49-51 mg per tablet Take 1 tablet by mouth 2 (two) times daily.    furosemide (LASIX) 20 MG tablet Take 20 mg by mouth 2 (two) times a day.    nitroGLYCERIN (NITROSTAT) 0.4 MG SL tablet Place 0.4 mg under the tongue daily as needed.    spironolactone (ALDACTONE) 25 MG tablet Take 25 mg by mouth every morning.            While in the hospital, will manage blood pressure as follows; Continue home antihypertensive regimen    Will utilize p.r.n. blood pressure medication only if patient's blood pressure greater than 140/90 and she develops symptoms such as worsening chest pain or shortness of breath.      VTE Risk Mitigation (From admission, onward)           Ordered     IP VTE LOW RISK PATIENT  Once         07/09/24 1448     Place sequential compression device  Until discontinued          07/09/24 1448                    Discharge Planning   ANAT: 7/13/2024     Code Status: Full Code   Is the patient medically ready for discharge?:     Reason for patient still in hospital (select all that apply): Patient trending condition, Treatment, and Consult recommendations  Discharge Plan A: Home                  Ayana Madrigal MD  Department of Hospital Medicine   [facility]

## 2024-07-10 NOTE — DISCHARGE SUMMARY
[facility]/Surg  [facility] Medicine  Discharge Summary      Patient Name: Job Hancock  MRN: [id number]  BHARGAVI: [id number]  Patient Class: IP- Inpatient  Admission Date: 7/9/2024  Hospital Length of Stay: 1 days  Discharge Date and Time: No discharge date for patient encounter.  Attending Physician: Mitch Yadav MD   Discharging Provider: Mitch Yadav MD  Primary Care Provider: Loyd Riley MD    Primary Care Team: Networked reference to record PCT     HPI:   79 yo F sent by Dr. Riley due to recent melanotic stool, noted labs done yesterday low Hgb 7.7  Pt noted no abdominal pain started with dark black stools on 07/04/2024 was dizzy this am, bp low 90/60 at PCP office sent for evaluation of bleeding and anemia. H/o GI bleed and PUD in the past was seen by GI in [place].  Denies pain prior to this.    * No surgery found *      Hospital Course:   07/09/2024 pt stable, no pain, bm was more brown, less black, bleeding scan in progress this am, plans for EGD per surgery.  Hgb 7.3 this am  07/10/2024 DISCHARGE SUMMARY: pt admitted with low H&H and melena, Hgb stabel above 7, no further black stool, EGD showed gastritis and polyp which was removed with snare.  Pt ready for d/c home and will f/u with Dr. Mancilla for results.  Will increase protonix to BID and she will f/u with PCP.     Goals of Care Treatment Preferences:  Code Status: Full Code      Consults:   Consults (From admission, onward)          Status Ordering Provider     Inpatient consult to General Surgery  Once        Provider:  Prasanna Mancilla MD    Acknowledged MITCH YADAV            Oncology  Iron deficiency anemia  give venofer today      Final Active Diagnoses:    Diagnosis Date Noted POA    PRINCIPAL PROBLEM:  Acute blood loss anemia [D62] 07/09/2024 Yes    Primary hypertension [I10] 07/10/2024 Yes    Iron deficiency anemia [D50.9] 07/25/2023 Yes      Problems Resolved During this Admission:        Discharged Condition: good    Disposition: Home or Self Care    Follow Up:   Follow-up Information       Loyd Riley MD. Go on 7/17/2024.    Specialty: Internal Medicine  Why: @ 10:30  Contact information:  [place] [id number]  [phone]               Prasanna Mancilla MD. Call.    Specialties: General Surgery, Cardiology  Why: SOMEONE WILL CALL YOU WITH APPOINTMENT  Contact information:  [address] [place] [address]  [phone]                           Patient Instructions:      Activity as tolerated       Significant Diagnostic Studies: Labs: CMP   Recent Labs   Lab 07/09/24  1456 07/10/24  0445   * 133*   K 4.9 5.0    104   CO2 26 25   BUN 33* 20   CREATININE 0.96 0.88   CALCIUM 9.5 9.4   ALBUMIN 4.1 3.5   BILITOT 0.4 0.4   ALKPHOS 105 100   AST 29 22   ALT 19 15    and CBC   Recent Labs   Lab 07/09/24  1456 07/09/24  1950 07/10/24  0445   WBC 6.79  --  5.16   HGB 7.6* 7.2* 7.3*   HCT 23.5* 22.5* 22.5*     --  278       Pending Diagnostic Studies:       Procedure Component Value Units Date/Time    Occult Blood, Stool 2nd Specimen [[id number]] Collected: 07/10/24 1238    Order Status: Sent Lab Status: No result     Specimen: Stool     Occult Blood, Stool, Diagnostic (1-3) [[id number]] Collected: 07/09/24 2031    Order Status: Sent Lab Status: In process Updated: 07/10/24 1556    Specimen: Stool     Narrative:      The following orders were created for panel order Occult Blood, Stool, Diagnostic (1-3).  Procedure                               Abnormality         Status                     ---------                               -----------         ------                     Occult Blood, Stool 1st...[[id number]]  Normal              Final result               Occult Blood, Stool 2nd...[[id number]]                                                 Occult Blood, Stool 3rd...[[id number]]  Normal              Final result                 Please view  results for these tests on the individual orders.    Specimen to Pathology [[id number]] Collected: 07/10/24 1222    Order Status: Sent Lab Status: In process Updated: 07/10/24 1329    Specimen: Tissue from Antrum, Tissue from Esophagus, Tissue from GE Junction     Specimen to Pathology Gastrointestinal tract [[id number]] Collected: 07/10/24 1238    Order Status: Sent Lab Status: In process Updated: 07/10/24 1329    Specimen: Tissue            Medications:  Reconciled Home Medications:      Medication List        CHANGE how you take these medications      pantoprazole 40 MG tablet  Commonly known as: PROTONIX  Take 1 tablet (40 mg total) by mouth 2 (two) times daily.  What changed: when to take this            CONTINUE taking these medications      aspirin 81 MG EC tablet  Commonly known as: ECOTRIN  Take 81 mg by mouth every morning. Stopped 6/27/23     carvediloL 6.25 MG tablet  Commonly known as: COREG  Take 6.25 mg by mouth 2 (two) times daily.     ENTRESTO 49-51 mg per tablet  Generic drug: sacubitriL-valsartan  Take 1 tablet by mouth 2 (two) times daily.     ezetimibe 10 mg tablet  Commonly known as: ZETIA  Take 10 mg by mouth every morning.     furosemide 20 MG tablet  Commonly known as: LASIX  Take 20 mg by mouth 2 (two) times a day.     nitroGLYCERIN 0.4 MG SL tablet  Commonly known as: NITROSTAT  Place 0.4 mg under the tongue daily as needed.     potassium chloride SA 20 MEQ tablet  Commonly known as: K-DUR,KLOR-CON  Take 20 mEq by mouth every morning.     rosuvastatin 5 MG tablet  Commonly known as: CRESTOR  Take 5 mg by mouth every evening.     spironolactone 25 MG tablet  Commonly known as: ALDACTONE  Take 25 mg by mouth every morning.     venlafaxine 75 MG 24 hr capsule  Commonly known as: EFFEXOR-XR  Take 75 mg by mouth every morning.              Indwelling Lines/Drains at time of discharge:   Lines/Drains/Airways       None                   Time spent on the discharge of patient: 37 minutes      Physical Exam  Constitutional:       General: She is not in acute distress.     Appearance: Normal appearance. She is normal weight. She is not ill-appearing.   Cardiovascular:      Rate and Rhythm: Normal rate and regular rhythm.      Pulses: Normal pulses.      Heart sounds: Normal heart sounds.   Pulmonary:      Effort: Pulmonary effort is normal.      Breath sounds: Normal breath sounds.   Abdominal:      General: Abdomen is flat.      Palpations: Abdomen is soft.   Skin:     General: Skin is warm and dry.      Findings: No erythema, lesion or rash.   Neurological:      Mental Status: She is alert.   Psychiatric:         Behavior: Behavior normal.      Comments: I had a face to face encounter with this patient prior to d/c       Patient Screened for food insecurity, housing instability, transportation needs, utility difficulties, and interpersonal safety.  No needs identified      Ayana Madrigal MD  Department of Hospital Medicine  [facility]/Surg

## 2024-07-22 ENCOUNTER — HOSPITAL ENCOUNTER (EMERGENCY)
Facility: HOSPITAL | Age: 79
Discharge: HOME | End: 2024-07-22
Attending: FAMILY MEDICINE
Payer: MEDICARE

## 2024-07-22 VITALS
BODY MASS INDEX: 20.78 KG/M2 | DIASTOLIC BLOOD PRESSURE: 57 MMHG | RESPIRATION RATE: 20 BRPM | HEART RATE: 77 BPM | TEMPERATURE: 98 F | WEIGHT: 124.69 LBS | HEIGHT: 65 IN | SYSTOLIC BLOOD PRESSURE: 126 MMHG | OXYGEN SATURATION: 100 %

## 2024-07-22 DIAGNOSIS — N17.9 ACUTE KIDNEY INJURY: Primary | ICD-10-CM

## 2024-07-22 DIAGNOSIS — R00.2 PALPITATIONS: ICD-10-CM

## 2024-07-22 LAB
ALBUMIN SERPL-MCNC: 4.7 G/DL (ref 3.4–5)
ALBUMIN/GLOB SERPL: 1.6 RATIO
ALP SERPL-CCNC: 108 UNIT/L (ref 50–144)
ALT SERPL-CCNC: 22 UNIT/L (ref 1–45)
ANION GAP SERPL CALC-SCNC: 12 MEQ/L (ref 2–13)
APTT PPP: 25.6 SECONDS (ref 23–29.4)
AST SERPL-CCNC: 31 UNIT/L (ref 14–36)
BASOPHILS # BLD AUTO: 0.04 X10(3)/MCL (ref 0.01–0.08)
BASOPHILS NFR BLD AUTO: 0.6 % (ref 0.1–1.2)
BILIRUB SERPL-MCNC: 0.3 MG/DL (ref 0–1)
BNP BLD-MCNC: 1080 PG/ML (ref 0–124.9)
BUN SERPL-MCNC: 49 MG/DL (ref 7–20)
CALCIUM SERPL-MCNC: 10 MG/DL (ref 8.4–10.2)
CHLORIDE SERPL-SCNC: 103 MMOL/L (ref 98–110)
CO2 SERPL-SCNC: 22 MMOL/L (ref 21–32)
CREAT SERPL-MCNC: 2.01 MG/DL (ref 0.66–1.25)
CREAT/UREA NIT SERPL: 24 (ref 12–20)
D DIMER PPP IA.FEU-MCNC: 1.24 MG/L (ref 0.19–0.5)
EOSINOPHIL # BLD AUTO: 0.23 X10(3)/MCL (ref 0.04–0.36)
EOSINOPHIL NFR BLD AUTO: 3.2 % (ref 0.7–7)
ERYTHROCYTE [DISTWIDTH] IN BLOOD BY AUTOMATED COUNT: 15.5 % (ref 11–14.5)
GFR SERPLBLD CREATININE-BSD FMLA CKD-EPI: 25 ML/MIN/1.73/M2
GLOBULIN SER-MCNC: 2.9 GM/DL (ref 2–3.9)
GLUCOSE SERPL-MCNC: 132 MG/DL (ref 70–115)
HCT VFR BLD AUTO: 28.5 % (ref 36–48)
HGB BLD-MCNC: 9.3 G/DL (ref 11.8–16)
IMM GRANULOCYTES # BLD AUTO: 0.17 X10(3)/MCL (ref 0–0.03)
IMM GRANULOCYTES NFR BLD AUTO: 2.4 % (ref 0–0.5)
INFLUENZA A (OHS): NEGATIVE
INFLUENZA B (OHS): NEGATIVE
INR PPP: 1
LACTATE SERPL-SCNC: 1.9 MMOL/L (ref 0.4–2)
LYMPHOCYTES # BLD AUTO: 1.48 X10(3)/MCL (ref 1.16–3.74)
LYMPHOCYTES NFR BLD AUTO: 20.6 % (ref 20–55)
MAGNESIUM SERPL-MCNC: 2 MG/DL (ref 1.8–2.4)
MCH RBC QN AUTO: 29.5 PG (ref 27–34)
MCHC RBC AUTO-ENTMCNC: 32.6 G/DL (ref 31–37)
MCV RBC AUTO: 90.5 FL (ref 79–99)
MONOCYTES # BLD AUTO: 0.56 X10(3)/MCL (ref 0.24–0.36)
MONOCYTES NFR BLD AUTO: 7.8 % (ref 4.7–12.5)
NEUTROPHILS # BLD AUTO: 4.72 X10(3)/MCL (ref 1.56–6.13)
NEUTROPHILS NFR BLD AUTO: 65.4 % (ref 37–73)
NRBC BLD AUTO-RTO: 0 %
PHOSPHATE SERPL-MCNC: 6.7 MG/DL (ref 2.5–4.9)
PLATELET # BLD AUTO: 303 X10(3)/MCL (ref 140–371)
PMV BLD AUTO: 9.2 FL (ref 9.4–12.4)
POTASSIUM SERPL-SCNC: 4.8 MMOL/L (ref 3.5–5.1)
PROT SERPL-MCNC: 7.6 GM/DL (ref 6.3–8.2)
PROTHROMBIN TIME: 9.9 SECONDS (ref 9.3–11.9)
RBC # BLD AUTO: 3.15 X10(6)/MCL (ref 4–5.1)
SARS-COV-2 RDRP RESP QL NAA+PROBE: NEGATIVE
SODIUM SERPL-SCNC: 137 MMOL/L (ref 136–145)
TROPONIN I SERPL-MCNC: <0.012 NG/ML (ref 0–0.03)
TSH SERPL-ACNC: 2.91 UIU/ML (ref 0.36–3.74)
WBC # BLD AUTO: 7.2 X10(3)/MCL (ref 4–11.5)

## 2024-07-22 PROCEDURE — 96360 HYDRATION IV INFUSION INIT: CPT

## 2024-07-22 PROCEDURE — 99285 EMERGENCY DEPT VISIT HI MDM: CPT | Mod: 25

## 2024-07-22 PROCEDURE — 85379 FIBRIN DEGRADATION QUANT: CPT | Performed by: FAMILY MEDICINE

## 2024-07-22 PROCEDURE — 93005 ELECTROCARDIOGRAM TRACING: CPT

## 2024-07-22 PROCEDURE — 96361 HYDRATE IV INFUSION ADD-ON: CPT

## 2024-07-22 PROCEDURE — 84100 ASSAY OF PHOSPHORUS: CPT | Performed by: FAMILY MEDICINE

## 2024-07-22 PROCEDURE — 83605 ASSAY OF LACTIC ACID: CPT | Performed by: FAMILY MEDICINE

## 2024-07-22 PROCEDURE — 80053 COMPREHEN METABOLIC PANEL: CPT | Performed by: FAMILY MEDICINE

## 2024-07-22 PROCEDURE — 85025 COMPLETE CBC W/AUTO DIFF WBC: CPT | Performed by: FAMILY MEDICINE

## 2024-07-22 PROCEDURE — 84443 ASSAY THYROID STIM HORMONE: CPT | Performed by: FAMILY MEDICINE

## 2024-07-22 PROCEDURE — 87400 INFLUENZA A/B EACH AG IA: CPT | Performed by: FAMILY MEDICINE

## 2024-07-22 PROCEDURE — 85610 PROTHROMBIN TIME: CPT | Performed by: FAMILY MEDICINE

## 2024-07-22 PROCEDURE — 93010 ELECTROCARDIOGRAM REPORT: CPT | Mod: ,,, | Performed by: INTERNAL MEDICINE

## 2024-07-22 PROCEDURE — 84484 ASSAY OF TROPONIN QUANT: CPT | Performed by: FAMILY MEDICINE

## 2024-07-22 PROCEDURE — 85730 THROMBOPLASTIN TIME PARTIAL: CPT | Performed by: FAMILY MEDICINE

## 2024-07-22 PROCEDURE — 87040 BLOOD CULTURE FOR BACTERIA: CPT | Performed by: FAMILY MEDICINE

## 2024-07-22 PROCEDURE — U0002 COVID-19 LAB TEST NON-CDC: HCPCS | Performed by: FAMILY MEDICINE

## 2024-07-22 PROCEDURE — 83735 ASSAY OF MAGNESIUM: CPT | Performed by: FAMILY MEDICINE

## 2024-07-22 PROCEDURE — 25000003 PHARM REV CODE 250: Performed by: FAMILY MEDICINE

## 2024-07-22 PROCEDURE — 83880 ASSAY OF NATRIURETIC PEPTIDE: CPT | Performed by: FAMILY MEDICINE

## 2024-07-22 RX ORDER — SODIUM CHLORIDE 9 MG/ML
1000 INJECTION, SOLUTION INTRAVENOUS
Status: COMPLETED | OUTPATIENT
Start: 2024-07-22 | End: 2024-07-22

## 2024-07-22 RX ADMIN — SODIUM CHLORIDE 1000 ML: 9 INJECTION, SOLUTION INTRAVENOUS at 03:07

## 2024-07-22 NOTE — ED PROVIDER NOTES
"Encounter Date: 7/22/2024       History     Chief Complaint   Patient presents with    Shortness of Breath    Dizziness    Palpitations     Reports that she has been feeling sob, racing heart, and feeling like she is "going to pass out" for the last 2 weeks since she came to the hospital. Reports she has a heart history.     Patient reports feeling short of breath and feeling her heart is racing.  States it has been going on but significantly worse today.  States she feels like she is going to pass out    The history is provided by the patient and a relative.   Shortness of Breath  This is a chronic problem. Pertinent negatives include no sore throat.   Dizziness  Associated symptoms include shortness of breath.   Palpitations   Associated symptoms include dizziness, weakness and shortness of breath.     Review of patient's allergies indicates:  No Known Allergies  Past Medical History:   Diagnosis Date    Anxiety disorder, unspecified     Arthritis     CHF (congestive heart failure)     Gastric ulcer     GERD (gastroesophageal reflux disease)     Heart disease     High cholesterol     History of heart artery stent     HTN (hypertension)     Primary osteoarthritis of first carpometacarpal joint of left hand 06/23/2022     Past Surgical History:   Procedure Laterality Date    BACK SURGERY      BUNIONECTOMY Right     CARPAL TUNNEL RELEASE Right 06/28/2023    Procedure: RELEASE, CARPAL TUNNEL;  Surgeon: Bayron Cordero MD;  Location: [facility] OR;  Service: Orthopedics;  Laterality: Right;    CORONARY ANGIOPLASTY WITH STENT PLACEMENT  2013    2 stents    HYSTERECTOMY      INTERPOSITION ARTHROPLASTY OF CARPOMETACARPAL JOINTS Left 06/23/2022    Procedure: INTERPOSITION ARTHROPLASTY, CMC JOINT / Ex. of trapezium with FCR inter postion graft & tight rope;  Surgeon: Bayron Cordero MD;  Location: [facility] OR;  Service: Orthopedics;  Laterality: Left;     Family History   Problem Relation Name Age of Onset    Breast cancer " Mother      No Known Problems Father       Social History     Tobacco Use    Smoking status: Never    Smokeless tobacco: Never   Substance Use Topics    Alcohol use: Not Currently    Drug use: Never     Review of Systems   HENT:  Negative for sore throat.    Respiratory:  Positive for shortness of breath.    Cardiovascular:  Positive for palpitations.   Neurological:  Positive for dizziness and weakness.   All other systems reviewed and are negative.      Physical Exam     Initial Vitals [07/22/24 1425]   BP Pulse Resp Temp SpO2   131/69 80 20 98.3 °F (36.8 °C) 99 %      MAP       --         Physical Exam    Nursing note and vitals reviewed.  Constitutional: She appears well-developed and well-nourished. She is not diaphoretic. No distress.   HENT:   Head: Normocephalic and atraumatic.   Nose: Nose normal.   Mouth/Throat: Oropharynx is clear and moist.   Eyes: Conjunctivae and EOM are normal. Pupils are equal, round, and reactive to light.   Neck: Neck supple. No tracheal deviation present.   Normal range of motion.  Cardiovascular:  Normal rate, intact distal pulses and normal pulses.     Exam reveals no decreased pulses.       Pulmonary/Chest: Effort normal. No respiratory distress.   Abdominal: Abdomen is soft. She exhibits no distension. There is no abdominal tenderness.   Musculoskeletal:         General: No tenderness. Normal range of motion.      Cervical back: Normal range of motion and neck supple.      Comments: No acute change     Neurological: She is alert and oriented to person, place, and time. GCS score is 15. GCS eye subscore is 4. GCS verbal subscore is 5. GCS motor subscore is 6.   No acute change   Skin: Skin is warm and dry. No rash noted.   Psychiatric: Thought content normal. Her mood appears anxious.   Tearful         ED Course   Procedures  Labs Reviewed   COMPREHENSIVE METABOLIC PANEL - Abnormal       Result Value    Sodium 137      Potassium 4.8      Chloride 103      CO2 22      Glucose  132 (*)     Blood Urea Nitrogen 49 (*)     Creatinine 2.01 (*)     Calcium 10.0      Protein Total 7.6      Albumin 4.7      Globulin 2.9      Albumin/Globulin Ratio 1.6      Bilirubin Total 0.3            ALT 22      AST 31      eGFR 25      Anion Gap 12.0      BUN/Creatinine Ratio 24 (*)    PHOSPHORUS - Abnormal    Phosphorus Level 6.7 (*)    NT-PRO NATRIURETIC PEPTIDE - Abnormal    ProBNP 1,080.0 (*)    CBC WITH DIFFERENTIAL - Abnormal    WBC 7.20      RBC 3.15 (*)     Hgb 9.3 (*)     Hct 28.5 (*)     MCV 90.5      MCH 29.5      MCHC 32.6      RDW 15.5 (*)     Platelet 303      MPV 9.2 (*)     Neut % 65.4      Lymph % 20.6      Mono % 7.8      Eos % 3.2      Basophil % 0.6      Lymph # 1.48      Neut # 4.72      Mono # 0.56 (*)     Eos # 0.23      Baso # 0.04      IG# 0.17 (*)     IG% 2.4 (*)     NRBC% 0.0     D DIMER, QUANTITATIVE - Abnormal    D-Dimer 1.24 (*)    RAPID INFLUENZA A/B - Normal    Influenza A Negative      Influenza B Negative     LACTIC ACID, PLASMA - Normal    Lactic Acid Level 1.9     MAGNESIUM - Normal    Magnesium Level 2.00     APTT - Normal    PTT 25.6     PROTIME-INR - Normal    PT 9.9      INR 1.0      Narrative:     Protimes are used to monitor anticoagulant agents such as warfarin. PT INR values are based on the current patient normal mean and the RADHA value for the specific instrument reagent used.  **Routine theraputic target values for the INR are 2.0-3.0**   TROPONIN I - Normal    Troponin-I <0.012     SARS-COV-2 RNA AMPLIFICATION, QUAL - Normal    SARS COV-2 Molecular Negative      Narrative:     The IDNOW COVID-19 assay is a rapid molecular in vitro diagnostic test utilizing an isothermal nucleic acid amplification technology intended for the qualitative detection of nucleic acid from the SARS-CoV-2 viral RNA in direct nasal, nasopharyngeal or throat swabs from individuals who are suspected of COVID-19 by their healthcare provider.   TSH - Normal    TSH 2.910     BLOOD  CULTURE [facility]   BLOOD CULTURE [facility]   CBC W/ AUTO DIFFERENTIAL    Narrative:     The following orders were created for panel order CBC auto differential.  Procedure                               Abnormality         Status                     ---------                               -----------         ------                     CBC with Differential[[id number]]       Abnormal            Final result                 Please view results for these tests on the individual orders.   URINALYSIS, REFLEX TO URINE CULTURE     EKG Readings: (Independently Interpreted)   Initial Reading: No STEMI. Rhythm: Normal Sinus Rhythm. Heart Rate: 77. Ectopy: No Ectopy. Conduction: Normal. ST Segments: Normal ST Segments. Clinical Impression: Normal Sinus Rhythm       Imaging Results              X-Ray Chest AP Portable (Final result)  Result time 07/22/24 14:45:28      Final result by Butch Merrill MD (07/22/24 14:45:28)                   Impression:      No acute cardiopulmonary process.      Electronically signed by: Butch Merrill MD  Date:    07/22/2024  Time:    14:45               Narrative:    EXAMINATION:  Chest one view    CLINICAL HISTORY:  Sepsis    COMPARISON:  06/20/2024    FINDINGS:  Cardiac silhouette is normal in size.  Central vessels are normal.  No confluent airspace disease or consolidation evident.  No visible pneumothorax or pleural effusion.  There are leads projected over the left hemithorax and right ventricle without pacemaker device, unchanged no acute osseous finding.                                    X-Rays:   Independently Interpreted Readings:   Chest X-Ray: No infiltrates.     Medications   0.9%  NaCl infusion (1,000 mLs Intravenous New Bag 7/22/24 1989)     Medical Decision Making  78-year-old female presenting to the emergency room with complaints of intermittent episodes of shortness of breath weakness palpitations.    I discussed all findings patient's family.  Patient does take Lasix for CHF.   D-dimer slightly elevated but vital signs stable normal heart rate and oxygen.  Feeling better after some fluids and relaxation.  Patient does admit to some anxiety.  Offered admission in the hospital.  Patient declined.  States she will hold her Lasix and follow up with her PCP and cardiologist    Amount and/or Complexity of Data Reviewed  Labs: ordered. Decision-making details documented in ED Course.  Radiology: ordered and independent interpretation performed. Decision-making details documented in ED Course.  ECG/medicine tests: ordered and independent interpretation performed. Decision-making details documented in ED Course.    Risk  Prescription drug management.      Additional MDM:   Differential Diagnosis:   ACS, mi, PE, pneumonia, arrhythmia, volume depletion             ED Course as of 07/22/24 1617 Mon Jul 22, 2024   1530 Phosphorus Level(!): 6.7 [CH]      ED Course User Index  [CH] Alfredo Vela MD                           Clinical Impression:  Final diagnoses:  [R00.2] Palpitations  [N17.9] Acute kidney injury (Primary)          ED Disposition Condition    Discharge Stable          ED Prescriptions    None       Follow-up Information       Follow up With Specialties Details Why Contact Info    Loyd Riley MD Internal Medicine Schedule an appointment as soon as possible for a visit in 1 day  [address] [facility] [address]  [phone]      Sunny Wyman MD Cardiology In 2 days  [address] [facility]  [address]  [facility] [address]  [phone]               Alfredo Vela MD  07/22/24 4989

## 2024-07-23 LAB
OHS QRS DURATION: 76 MS
OHS QTC CALCULATION: 411 MS

## 2024-07-24 ENCOUNTER — LAB REQUISITION (OUTPATIENT)
Dept: LAB | Facility: HOSPITAL | Age: 79
End: 2024-07-24
Payer: MEDICARE

## 2024-07-24 DIAGNOSIS — N17.9 ACUTE KIDNEY FAILURE, UNSPECIFIED: ICD-10-CM

## 2024-07-24 DIAGNOSIS — E87.1 HYPO-OSMOLALITY AND HYPONATREMIA: ICD-10-CM

## 2024-07-24 LAB
ANION GAP SERPL CALC-SCNC: 11 MEQ/L (ref 2–13)
BUN SERPL-MCNC: 44 MG/DL (ref 7–20)
CALCIUM SERPL-MCNC: 9.4 MG/DL (ref 8.4–10.2)
CHLORIDE SERPL-SCNC: 104 MMOL/L (ref 98–110)
CO2 SERPL-SCNC: 21 MMOL/L (ref 21–32)
CREAT SERPL-MCNC: 0.96 MG/DL (ref 0.66–1.25)
CREAT/UREA NIT SERPL: 46 (ref 12–20)
GFR SERPLBLD CREATININE-BSD FMLA CKD-EPI: 60 ML/MIN/1.73/M2
GLUCOSE SERPL-MCNC: 99 MG/DL (ref 70–115)
POTASSIUM SERPL-SCNC: 4.8 MMOL/L (ref 3.5–5.1)
SODIUM SERPL-SCNC: 136 MMOL/L (ref 136–145)

## 2024-07-24 PROCEDURE — 80048 BASIC METABOLIC PNL TOTAL CA: CPT | Performed by: INTERNAL MEDICINE

## 2024-07-27 LAB
BACTERIA BLD CULT: NORMAL
BACTERIA BLD CULT: NORMAL

## 2024-08-09 LAB
BEAKER SEE SCANNED REPORT: NORMAL
BEAKER SEE SCANNED REPORT: NORMAL

## 2024-12-03 ENCOUNTER — LAB VISIT (OUTPATIENT)
Dept: LAB | Facility: HOSPITAL | Age: 79
End: 2024-12-03
Attending: INTERNAL MEDICINE
Payer: MEDICARE

## 2024-12-03 DIAGNOSIS — I13.10 HYPERTENSIVE HEART AND RENAL DISEASE: Primary | ICD-10-CM

## 2024-12-03 LAB
ANION GAP SERPL CALC-SCNC: 8 MEQ/L (ref 2–13)
BUN SERPL-MCNC: 25 MG/DL (ref 7–20)
CALCIUM SERPL-MCNC: 10 MG/DL (ref 8.4–10.2)
CHLORIDE SERPL-SCNC: 99 MMOL/L (ref 98–110)
CO2 SERPL-SCNC: 29 MMOL/L (ref 21–32)
CREAT SERPL-MCNC: 1.19 MG/DL (ref 0.66–1.25)
CREAT/UREA NIT SERPL: 21 (ref 12–20)
GFR SERPLBLD CREATININE-BSD FMLA CKD-EPI: 46 ML/MIN/1.73/M2
GLUCOSE SERPL-MCNC: 122 MG/DL (ref 70–115)
POTASSIUM SERPL-SCNC: 4.4 MMOL/L (ref 3.5–5.1)
SODIUM SERPL-SCNC: 136 MMOL/L (ref 136–145)

## 2024-12-03 PROCEDURE — 80048 BASIC METABOLIC PNL TOTAL CA: CPT

## 2024-12-03 PROCEDURE — 36415 COLL VENOUS BLD VENIPUNCTURE: CPT

## 2025-02-05 ENCOUNTER — HOSPITAL ENCOUNTER (OUTPATIENT)
Dept: RADIOLOGY | Facility: HOSPITAL | Age: 80
Discharge: HOME | End: 2025-02-05
Attending: SPECIALIST
Payer: MEDICARE

## 2025-02-05 DIAGNOSIS — M54.50 ACUTE MIDLINE LOW BACK PAIN WITHOUT SCIATICA: ICD-10-CM

## 2025-02-05 PROCEDURE — 72131 CT LUMBAR SPINE W/O DYE: CPT | Mod: TC

## 2025-03-20 ENCOUNTER — HOSPITAL ENCOUNTER (OUTPATIENT)
Dept: RADIOLOGY | Facility: HOSPITAL | Age: 80
Discharge: HOME | End: 2025-03-20
Attending: INTERNAL MEDICINE
Payer: MEDICARE

## 2025-03-20 DIAGNOSIS — Z12.31 SCREENING MAMMOGRAM, ENCOUNTER FOR: ICD-10-CM

## 2025-03-20 PROCEDURE — 77063 BREAST TOMOSYNTHESIS BI: CPT | Mod: TC

## (undated) DEVICE — GLOVE PROTEXIS BLUE LATEX 7

## (undated) DEVICE — GLOVE PROTEXIS HYDROGEL SZ7.5

## (undated) DEVICE — SEE MEDLINE ITEM 157216

## (undated) DEVICE — BANDAGE ESMARK ELASTIC ST 4X9

## (undated) DEVICE — BLANKET THERMOFLECT 4X7

## (undated) DEVICE — WRAP COBAN NL STRL 4INX5YD

## (undated) DEVICE — DRESSING N ADH OIL EMUL 3X3

## (undated) DEVICE — SEE MEDLINE ITEM 157117

## (undated) DEVICE — GLOVE PROTEXIS LTX 6.5

## (undated) DEVICE — BANDAGE ELASTIC 3IN ACE NS

## (undated) DEVICE — BLANKET SNUGGLE WARM LOWER BDY

## (undated) DEVICE — GLOVE PROTEXIS BLUE LATEX 7.5

## (undated) DEVICE — PADDING CAST AST SOFT-ROLL 3X4

## (undated) DEVICE — GAUZE SPONGE 4X4 12PLY

## (undated) DEVICE — GLOVE PROTEXIS HYDROGEL SZ6.5

## (undated) DEVICE — DRESSING TRANS 2X2 TEGADERM

## (undated) DEVICE — COVER C-ARM STRAP BAND 44X80IN

## (undated) DEVICE — SUPPORT ULNA NERVE PROTECTOR

## (undated) DEVICE — PADDING CAST 3 X 4YD

## (undated) DEVICE — SYR 50CC LL

## (undated) DEVICE — GLOVE PROTEXIS HYDROGEL SZ8.5

## (undated) DEVICE — SEE MEDLINE ITEM 157173

## (undated) DEVICE — Device

## (undated) DEVICE — DRESSING ADAPTIC N ADH 3X8IN

## (undated) DEVICE — NDL BLUNT FILL 18G 1IN

## (undated) DEVICE — GLOVE PROTEXIS BLUE LATEX 8.5

## (undated) DEVICE — NDL HYPO POLYPR STD 26G 1.5IN

## (undated) DEVICE — SUT VICRYL 2-0 J589H 27IN

## (undated) DEVICE — SUT 3-0 VICRYL / SH (J416)

## (undated) DEVICE — SUT 3-0 ETHILON 18 FS-1

## (undated) DEVICE — POSITIONER HEEL FOAM CONVOLTD

## (undated) DEVICE — SYR 10CC LUER LOCK

## (undated) DEVICE — SUT VICRYL 0 CT-2 27 DYE

## (undated) DEVICE — GOWN POLY REINF BRTH SLV XL

## (undated) DEVICE — SUT BLK MONO 3-0 CUT 18IN F

## (undated) DEVICE — SPLINT FIBERGLASS PAD 2X15

## (undated) DEVICE — SUT 4.0 ETHILON

## (undated) DEVICE — DRAPE HAND STERILE

## (undated) DEVICE — DURAPREP SURG SCRUB 26ML

## (undated) DEVICE — BANDAGE COMPR DBL HOOK 3INX5YD

## (undated) DEVICE — SPLINT FIBERGLASS PAD 3X15